# Patient Record
Sex: FEMALE | Race: WHITE | NOT HISPANIC OR LATINO | Employment: OTHER | URBAN - METROPOLITAN AREA
[De-identification: names, ages, dates, MRNs, and addresses within clinical notes are randomized per-mention and may not be internally consistent; named-entity substitution may affect disease eponyms.]

---

## 2023-06-02 ENCOUNTER — TELEPHONE (OUTPATIENT)
Dept: FAMILY MEDICINE CLINIC | Facility: CLINIC | Age: 88
End: 2023-06-02

## 2023-06-02 NOTE — TELEPHONE ENCOUNTER
Caller: IMELDA TSANG    Relationship to patient: Emergency Contact    Best call back number: [phone]    Type of visit: NEW PATIENT    Requested date: AS SOON AS POSSIBLE    If rescheduling, when is the original appointment: 6.23.23/7.21.23    Additional notes: PLEASE CALL TO RESCHEDULE THE PATIENT SOONER OR CALL TO ADVISE.     THANK YOU.

## 2023-06-29 PROBLEM — F03.B3 MODERATE DEMENTIA WITH MOOD DISTURBANCE: Status: ACTIVE | Noted: 2023-06-29

## 2023-06-29 PROBLEM — K21.9 GASTROESOPHAGEAL REFLUX DISEASE WITHOUT ESOPHAGITIS: Status: ACTIVE | Noted: 2023-06-29

## 2023-06-29 PROBLEM — Z13.220 ENCOUNTER FOR LIPID SCREENING FOR CARDIOVASCULAR DISEASE: Status: ACTIVE | Noted: 2023-06-29

## 2023-06-29 PROBLEM — E03.9 HYPOTHYROIDISM: Status: ACTIVE | Noted: 2023-06-29

## 2023-06-29 PROBLEM — Z79.899 HIGH RISK MEDICATION USE: Status: ACTIVE | Noted: 2023-06-29

## 2023-06-29 PROBLEM — Z13.6 ENCOUNTER FOR LIPID SCREENING FOR CARDIOVASCULAR DISEASE: Status: ACTIVE | Noted: 2023-06-29

## 2023-06-29 PROBLEM — F33.1 MODERATE EPISODE OF RECURRENT MAJOR DEPRESSIVE DISORDER: Status: ACTIVE | Noted: 2023-06-29

## 2023-06-29 PROBLEM — R51.9 PERSISTENT HEADACHES: Status: ACTIVE | Noted: 2023-06-29

## 2023-08-03 ENCOUNTER — OFFICE VISIT (OUTPATIENT)
Dept: FAMILY MEDICINE CLINIC | Facility: CLINIC | Age: 88
End: 2023-08-03
Payer: MEDICARE

## 2023-08-03 VITALS
SYSTOLIC BLOOD PRESSURE: 118 MMHG | HEIGHT: 62 IN | TEMPERATURE: 98 F | OXYGEN SATURATION: 97 % | RESPIRATION RATE: 15 BRPM | BODY MASS INDEX: 24.73 KG/M2 | HEART RATE: 78 BPM | WEIGHT: 134.4 LBS | DIASTOLIC BLOOD PRESSURE: 74 MMHG

## 2023-08-03 DIAGNOSIS — N18.31 STAGE 3A CHRONIC KIDNEY DISEASE (CKD): ICD-10-CM

## 2023-08-03 DIAGNOSIS — R51.9 PERSISTENT HEADACHES: ICD-10-CM

## 2023-08-03 DIAGNOSIS — K59.1 FUNCTIONAL DIARRHEA: ICD-10-CM

## 2023-08-03 DIAGNOSIS — R73.03 PREDIABETES: ICD-10-CM

## 2023-08-03 DIAGNOSIS — F33.1 MODERATE EPISODE OF RECURRENT MAJOR DEPRESSIVE DISORDER: ICD-10-CM

## 2023-08-03 DIAGNOSIS — F03.B3 MODERATE DEMENTIA WITH MOOD DISTURBANCE, UNSPECIFIED DEMENTIA TYPE: ICD-10-CM

## 2023-08-03 DIAGNOSIS — R42 LIGHTHEADEDNESS: Primary | ICD-10-CM

## 2023-08-03 RX ORDER — SERTRALINE HYDROCHLORIDE 100 MG/1
100 TABLET, FILM COATED ORAL DAILY
Qty: 90 TABLET | Refills: 1 | Status: SHIPPED | OUTPATIENT
Start: 2023-08-03

## 2023-08-03 RX ORDER — LOPERAMIDE HYDROCHLORIDE 2 MG/1
2 TABLET ORAL 2 TIMES DAILY PRN
Qty: 180 TABLET | Refills: 1 | Status: SHIPPED | OUTPATIENT
Start: 2023-08-03

## 2023-08-03 RX ORDER — DONEPEZIL HYDROCHLORIDE 5 MG/1
5 TABLET, FILM COATED ORAL NIGHTLY
Qty: 90 TABLET | Refills: 1 | Status: SHIPPED | OUTPATIENT
Start: 2023-08-03

## 2023-08-04 PROBLEM — N18.31 STAGE 3A CHRONIC KIDNEY DISEASE (CKD): Status: ACTIVE | Noted: 2023-08-04

## 2023-08-04 PROBLEM — R42 LIGHTHEADEDNESS: Status: ACTIVE | Noted: 2023-08-04

## 2023-08-04 PROBLEM — R73.03 PREDIABETES: Status: ACTIVE | Noted: 2023-08-04

## 2023-08-05 ENCOUNTER — APPOINTMENT (OUTPATIENT)
Dept: CT IMAGING | Facility: HOSPITAL | Age: 88
End: 2023-08-05
Payer: MEDICARE

## 2023-08-05 ENCOUNTER — HOSPITAL ENCOUNTER (EMERGENCY)
Facility: HOSPITAL | Age: 88
Discharge: HOME | End: 2023-08-05
Attending: EMERGENCY MEDICINE
Payer: MEDICARE

## 2023-08-05 VITALS
HEART RATE: 64 BPM | BODY MASS INDEX: 24.66 KG/M2 | SYSTOLIC BLOOD PRESSURE: 143 MMHG | RESPIRATION RATE: 14 BRPM | TEMPERATURE: 97.8 F | DIASTOLIC BLOOD PRESSURE: 62 MMHG | HEIGHT: 62 IN | WEIGHT: 134 LBS | OXYGEN SATURATION: 95 %

## 2023-08-05 DIAGNOSIS — R10.9 ABDOMINAL CRAMPING: Primary | ICD-10-CM

## 2023-08-05 LAB
ALBUMIN SERPL-MCNC: 4.3 G/DL (ref 3.5–5.2)
ALBUMIN/GLOB SERPL: 1.5 G/DL
ALP SERPL-CCNC: 88 U/L (ref 39–117)
ALT SERPL W P-5'-P-CCNC: 15 U/L (ref 1–33)
ANION GAP SERPL CALCULATED.3IONS-SCNC: 11 MMOL/L (ref 5–15)
AST SERPL-CCNC: 24 U/L (ref 1–32)
BASOPHILS # BLD AUTO: 0.07 10*3/MM3 (ref 0–0.2)
BASOPHILS NFR BLD AUTO: 0.7 % (ref 0–1.5)
BILIRUB SERPL-MCNC: 0.4 MG/DL (ref 0–1.2)
BILIRUB UR QL STRIP: NEGATIVE
BUN SERPL-MCNC: 11 MG/DL (ref 8–23)
BUN/CREAT SERPL: 10.5 (ref 7–25)
CALCIUM SPEC-SCNC: 9.3 MG/DL (ref 8.6–10.5)
CHLORIDE SERPL-SCNC: 100 MMOL/L (ref 98–107)
CLARITY UR: CLEAR
CO2 SERPL-SCNC: 29 MMOL/L (ref 22–29)
COLOR UR: YELLOW
CREAT SERPL-MCNC: 1.05 MG/DL (ref 0.57–1)
D-LACTATE SERPL-SCNC: 1.1 MMOL/L (ref 0.5–2)
DEPRECATED RDW RBC AUTO: 49.7 FL (ref 37–54)
EGFRCR SERPLBLD CKD-EPI 2021: 51.5 ML/MIN/1.73
EOSINOPHIL # BLD AUTO: 0.12 10*3/MM3 (ref 0–0.4)
EOSINOPHIL NFR BLD AUTO: 1.3 % (ref 0.3–6.2)
ERYTHROCYTE [DISTWIDTH] IN BLOOD BY AUTOMATED COUNT: 15.4 % (ref 12.3–15.4)
GLOBULIN UR ELPH-MCNC: 2.8 GM/DL
GLUCOSE SERPL-MCNC: 88 MG/DL (ref 65–99)
GLUCOSE UR STRIP-MCNC: NEGATIVE MG/DL
HCT VFR BLD AUTO: 42.4 % (ref 34–46.6)
HGB BLD-MCNC: 12.8 G/DL (ref 12–15.9)
HGB UR QL STRIP.AUTO: NEGATIVE
HOLD SPECIMEN: NORMAL
IMM GRANULOCYTES # BLD AUTO: 0.03 10*3/MM3 (ref 0–0.05)
IMM GRANULOCYTES NFR BLD AUTO: 0.3 % (ref 0–0.5)
KETONES UR QL STRIP: NEGATIVE
LEUKOCYTE ESTERASE UR QL STRIP.AUTO: NEGATIVE
LIPASE SERPL-CCNC: 89 U/L (ref 13–60)
LYMPHOCYTES # BLD AUTO: 2.17 10*3/MM3 (ref 0.7–3.1)
LYMPHOCYTES NFR BLD AUTO: 23.1 % (ref 19.6–45.3)
MCH RBC QN AUTO: 26.6 PG (ref 26.6–33)
MCHC RBC AUTO-ENTMCNC: 30.2 G/DL (ref 31.5–35.7)
MCV RBC AUTO: 88.1 FL (ref 79–97)
MONOCYTES # BLD AUTO: 0.57 10*3/MM3 (ref 0.1–0.9)
MONOCYTES NFR BLD AUTO: 6.1 % (ref 5–12)
NEUTROPHILS NFR BLD AUTO: 6.44 10*3/MM3 (ref 1.7–7)
NEUTROPHILS NFR BLD AUTO: 68.5 % (ref 42.7–76)
NITRITE UR QL STRIP: NEGATIVE
NRBC BLD AUTO-RTO: 0 /100 WBC (ref 0–0.2)
PH UR STRIP.AUTO: 5.5 [PH] (ref 5–8)
PLATELET # BLD AUTO: 248 10*3/MM3 (ref 140–450)
PMV BLD AUTO: 9.6 FL (ref 6–12)
POTASSIUM SERPL-SCNC: 4.3 MMOL/L (ref 3.5–5.2)
PROT SERPL-MCNC: 7.1 G/DL (ref 6–8.5)
PROT UR QL STRIP: NEGATIVE
RBC # BLD AUTO: 4.81 10*6/MM3 (ref 3.77–5.28)
SODIUM SERPL-SCNC: 140 MMOL/L (ref 136–145)
SP GR UR STRIP: 1.01 (ref 1–1.03)
UROBILINOGEN UR QL STRIP: NORMAL
WBC NRBC COR # BLD: 9.4 10*3/MM3 (ref 3.4–10.8)
WHOLE BLOOD HOLD COAG: NORMAL
WHOLE BLOOD HOLD SPECIMEN: NORMAL

## 2023-08-05 PROCEDURE — 74177 CT ABD & PELVIS W/CONTRAST: CPT

## 2023-08-05 PROCEDURE — 85025 COMPLETE CBC W/AUTO DIFF WBC: CPT | Performed by: EMERGENCY MEDICINE

## 2023-08-05 PROCEDURE — 80053 COMPREHEN METABOLIC PANEL: CPT | Performed by: EMERGENCY MEDICINE

## 2023-08-05 PROCEDURE — 81003 URINALYSIS AUTO W/O SCOPE: CPT | Performed by: NURSE PRACTITIONER

## 2023-08-05 PROCEDURE — 83690 ASSAY OF LIPASE: CPT | Performed by: EMERGENCY MEDICINE

## 2023-08-05 PROCEDURE — 25510000001 IOPAMIDOL 61 % SOLUTION: Performed by: EMERGENCY MEDICINE

## 2023-08-05 PROCEDURE — 99285 EMERGENCY DEPT VISIT HI MDM: CPT

## 2023-08-05 PROCEDURE — 83605 ASSAY OF LACTIC ACID: CPT | Performed by: EMERGENCY MEDICINE

## 2023-08-05 PROCEDURE — 82565 ASSAY OF CREATININE: CPT

## 2023-08-05 RX ORDER — SODIUM CHLORIDE 0.9 % (FLUSH) 0.9 %
10 SYRINGE (ML) INJECTION AS NEEDED
Status: DISCONTINUED | OUTPATIENT
Start: 2023-08-05 | End: 2023-08-05 | Stop reason: HOSPADM

## 2023-08-05 RX ORDER — SODIUM CHLORIDE 9 MG/ML
10 INJECTION INTRAVENOUS AS NEEDED
Status: DISCONTINUED | OUTPATIENT
Start: 2023-08-05 | End: 2023-08-05 | Stop reason: HOSPADM

## 2023-08-05 RX ADMIN — SODIUM CHLORIDE 500 ML: 9 INJECTION, SOLUTION INTRAVENOUS at 17:29

## 2023-08-05 RX ADMIN — IOPAMIDOL 90 ML: 612 INJECTION, SOLUTION INTRAVENOUS at 18:10

## 2023-08-05 NOTE — ED PROVIDER NOTES
EMERGENCY DEPARTMENT ENCOUNTER    Pt Name: Joanna Crockett  MRN: [id number]  Pt :   1935  Room Number:    Date of encounter:  2023  PCP: Lianne Castellanos PA  ED Provider: ERNESTO Maria    Historian: patient      HPI:  Chief Complaint: Abdominal pain        Context: Joanna Crockett is a 88 y.o. female who presents to the ED c/o intermittent abdominal pain for the last several days.  Her son and caregiver volunteers that she completed a course of antibiotics 1 week ago for urinary tract infection.  Because she had some diarrhea, she took an Imodium yesterday and since then, she has not been able to have a bowel movement.  Her last normal bowel movement was day before yesterday.  She has had no rectal bleeding.  She denies any rectal pain or fullness.  She denies any dysuria, frequency or urgency.  She has been eating, drinking well.    Review of systems is negative for fever or chills.  Negative for chest pain or cough or shortness of breath.  GI: Positive for constipation with bowel movement last effective on 2 days ago.  No rectal bleeding.  No nausea or vomiting.  : History of UTI without history of current dysuria, frequency or urgency.  No profound weakness, dizziness or syncope.  No neurosensory complaint or focal weakness he has a history of dementia and has poor recollection of her recent symptoms.      PAST MEDICAL HISTORY  Past Medical History:   Diagnosis Date    Dementia     Depression     Diarrhea     GERD (gastroesophageal reflux disease)     Hypothyroidism          PAST SURGICAL HISTORY  No past surgical history on file.      FAMILY HISTORY  No family history on file.      SOCIAL HISTORY  Social History     Socioeconomic History    Marital status:    Tobacco Use    Smoking status: Never    Smokeless tobacco: Never   Substance and Sexual Activity    Alcohol use: Never    Drug use: Never    Sexual activity: Defer         ALLERGIES  Patient has no known  allergies.        REVIEW OF SYSTEMS  Review of Systems     All systems reviewed and negative except for those discussed in HPI.       PHYSICAL EXAM    I have reviewed the triage vital signs and nursing notes.    ED Triage Vitals [08/05/23 1411]   Temp Heart Rate Resp BP SpO2   97.8 øF (36.6 øC) 62 14 136/67 92 %      Temp src Heart Rate Source Patient Position BP Location FiO2 (%)   Oral Monitor Sitting Right arm --       Physical Exam  GENERAL:   Appears in no acute distress.  Patient is alert and oriented.  She is a very good historian with exception of some memory problems for which she looks to her son for accurate answers.  He is a good historian at her bedside  HENT: Nares patent.  EYES: No scleral icterus.  CV: Regular rhythm, regular rate.  No tachycardia.  No peripheral edema  RESPIRATORY: Normal effort.  No audible wheezes, rales or rhonchi.  ABDOMEN: Soft, nontender.  No flank pain.  MUSCULOSKELETAL: No deformities.   NEURO: Alert, moves all extremities, follows commands.  SKIN: Warm, dry, no rash visualized.      LAB RESULTS  Recent Results (from the past 24 hour(s))   Comprehensive Metabolic Panel    Collection Time: 08/05/23  3:01 PM    Specimen: Blood   Result Value Ref Range    Glucose 88 65 - 99 mg/dL    BUN 11 8 - 23 mg/dL    Creatinine 1.05 (H) 0.57 - 1.00 mg/dL    Sodium 140 136 - 145 mmol/L    Potassium 4.3 3.5 - 5.2 mmol/L    Chloride 100 98 - 107 mmol/L    CO2 29.0 22.0 - 29.0 mmol/L    Calcium 9.3 8.6 - 10.5 mg/dL    Total Protein 7.1 6.0 - 8.5 g/dL    Albumin 4.3 3.5 - 5.2 g/dL    ALT (SGPT) 15 1 - 33 U/L    AST (SGOT) 24 1 - 32 U/L    Alkaline Phosphatase 88 39 - 117 U/L    Total Bilirubin 0.4 0.0 - 1.2 mg/dL    Globulin 2.8 gm/dL    A/G Ratio 1.5 g/dL    BUN/Creatinine Ratio 10.5 7.0 - 25.0    Anion Gap 11.0 5.0 - 15.0 mmol/L    eGFR 51.5 (L) >60.0 mL/min/1.73   Lipase    Collection Time: 08/05/23  3:01 PM    Specimen: Blood   Result Value Ref Range    Lipase 89 (H) 13 - 60 U/L   Lactic  Acid, Plasma    Collection Time: 08/05/23  3:01 PM    Specimen: Blood   Result Value Ref Range    Lactate 1.1 0.5 - 2.0 mmol/L   Green Top (Gel)    Collection Time: 08/05/23  3:01 PM   Result Value Ref Range    Extra Tube Hold for add-ons.    Lavender Top    Collection Time: 08/05/23  3:01 PM   Result Value Ref Range    Extra Tube hold for add-on    Gold Top - SST    Collection Time: 08/05/23  3:01 PM   Result Value Ref Range    Extra Tube Hold for add-ons.    Light Blue Top    Collection Time: 08/05/23  3:01 PM   Result Value Ref Range    Extra Tube Hold for add-ons.    CBC Auto Differential    Collection Time: 08/05/23  3:01 PM    Specimen: Blood   Result Value Ref Range    WBC 9.40 3.40 - 10.80 10*3/mm3    RBC 4.81 3.77 - 5.28 10*6/mm3    Hemoglobin 12.8 12.0 - 15.9 g/dL    Hematocrit 42.4 34.0 - 46.6 %    MCV 88.1 79.0 - 97.0 fL    MCH 26.6 26.6 - 33.0 pg    MCHC 30.2 (L) 31.5 - 35.7 g/dL    RDW 15.4 12.3 - 15.4 %    RDW-SD 49.7 37.0 - 54.0 fl    MPV 9.6 6.0 - 12.0 fL    Platelets 248 140 - 450 10*3/mm3    Neutrophil % 68.5 42.7 - 76.0 %    Lymphocyte % 23.1 19.6 - 45.3 %    Monocyte % 6.1 5.0 - 12.0 %    Eosinophil % 1.3 0.3 - 6.2 %    Basophil % 0.7 0.0 - 1.5 %    Immature Grans % 0.3 0.0 - 0.5 %    Neutrophils, Absolute 6.44 1.70 - 7.00 10*3/mm3    Lymphocytes, Absolute 2.17 0.70 - 3.10 10*3/mm3    Monocytes, Absolute 0.57 0.10 - 0.90 10*3/mm3    Eosinophils, Absolute 0.12 0.00 - 0.40 10*3/mm3    Basophils, Absolute 0.07 0.00 - 0.20 10*3/mm3    Immature Grans, Absolute 0.03 0.00 - 0.05 10*3/mm3    nRBC 0.0 0.0 - 0.2 /100 WBC   Urinalysis With Microscopic If Indicated (No Culture) - Urine, Clean Catch    Collection Time: 08/05/23  5:02 PM    Specimen: Urine, Clean Catch   Result Value Ref Range    Color, UA Yellow Yellow, Straw    Appearance, UA Clear Clear    pH, UA 5.5 5.0 - 8.0    Specific Gravity, UA 1.012 1.001 - 1.030    Glucose, UA Negative Negative    Ketones, UA Negative Negative    Bilirubin, UA  Negative Negative    Blood, UA Negative Negative    Protein, UA Negative Negative    Leuk Esterase, UA Negative Negative    Nitrite, UA Negative Negative    Urobilinogen, UA 0.2 E.U./dL 0.2 - 1.0 E.U./dL       If labs were ordered, I independently reviewed the results and considered them in treating the patient.        RADIOLOGY  CT Abdomen Pelvis With Contrast    Result Date: 8/5/2023  CT ABDOMEN PELVIS W CONTRAST Date of Exam: 8/5/2023 5:56 PM EDT Indication: Lower abdominal pain. Comparison: Right upper quadrant ultrasound dated 12/7/2004. Technique: Axial CT images were obtained of the abdomen and pelvis following the uneventful intravenous administration of 90 mL Isovue 300. Reconstructed coronal and sagittal images were also obtained. Automated exposure control and iterative construction methods were used. Findings: The heart size is normal. There is no pericardial effusion. The lung bases are clear. The liver is normal in size and contour. There are multiple small low-density lesions throughout the liver likely representing small cysts. The gallbladder is surgically absent. There is dilation of the extrahepatic bile duct measuring up to 9 mm. This can be seen with physiologic changes after cholecystectomy in the absence of abnormal liver function tests or elevated bilirubin. The spleen, adrenal glands, and pancreas appear within normal limits. The kidneys are symmetric in size and enhancement. There is no hydronephrosis or hydroureter. The urinary bladder is partially fluid-filled without wall thickening. Evaluation of the pelvis is limited due to streak artifact from patient's left total hip prosthesis. There is a small hiatal hernia. The small bowel is normal in caliber without evidence of small bowel obstruction or small bowel inflammation. The appendix is not visualized and may be surgically absent. There is no evidence of acute colitis. There is pancolonic diverticulosis without evidence of acute  diverticulitis. There is no free fluid or free air. The aorta is normal in caliber without aneurysm formation. There is mild aortoiliac atherosclerotic disease. There is no mesenteric, retroperitoneal, or pelvic lymphadenopathy. There are degenerative changes at L4-L5 with endplate osteophyte formation. There is a left total hip prosthesis.     Impression: 1. No acute intra-abdominal or pelvic abnormality. 2. Pancolonic diverticulosis without evidence of acute diverticulitis. 3. Multiple small simple hepatic cysts. 4. Dilation of the common bile duct measuring up to 9 mm likely representing physiologic changes after cholecystectomy in the absence of abnormal liver function tests or elevated bilirubin. 5. Small sliding-type hiatal hernia. Electronically Signed: Javier Nairelor  8/5/2023 6:21 PM EDT  Workstation ID: [id number]   PROCEDURES    Procedures    No orders to display       MEDICATIONS GIVEN IN ER    Medications   Sodium Chloride (PF) 0.9 % 10 mL (has no administration in time range)   sodium chloride 0.9 % flush 10 mL (has no administration in time range)   sodium chloride 0.9 % bolus 500 mL (500 mL Intravenous New Bag 8/5/23 1729)   iopamidol (ISOVUE-300) 61 % injection 100 mL (90 mL Intravenous Given 8/5/23 1810)         MEDICAL DECISION MAKING, PROGRESS, and CONSULTS    All labs have been independently reviewed by me.  All radiology studies have been reviewed by me and the radiologist dictating the report.  All EKG's have been independently viewed and interpreted by me/my attending physician.          Orders placed during this visit:  Orders Placed This Encounter   Procedures    CT Abdomen Pelvis With Contrast    [place] Draw    Comprehensive Metabolic Panel    Lipase    Lactic Acid, Plasma    CBC Auto Differential    Urinalysis With Microscopic If Indicated (No Culture) - Urine, Catheter    NPO Diet NPO Type: Strict NPO    Undress & Gown    Insert Peripheral IV    Insert Peripheral IV    CBC & Differential     Green Top (Gel)    Lavender Top    Gold Top - SST    Gray Top    Light Blue Top         Additional orders considered but not ordered:      ED Course:    Consultants:      ED Course as of 08/05/23 1858   Sat Aug 05, 2023   1827 CT Abdomen Pelvis With Contrast [MS]   1856 Patient's work-up has been negative for acute findings.  In the meantime, Ms. Crockett has been asymptomatic throughout her ED course.  Her vital signs of been stable throughout her ED course.  We discussed with her and her son parameters for concern that would warrant return to the emergency department.  Ms. Crockett and her son understand and concur with this outpatient plan and close follow-up [MS]   1857 They are relieved to find that she has no urinary tract infection. [MS]      ED Course User Index  [MS] TaniaNorma meeks APRN              Shared Decision Making:  After my consideration of clinical presentation and any laboratory/radiology studies obtained, I discussed the findings with the patient/patient representative who is in agreement with the treatment plan and the final disposition.   Risks and benefits of discharge and/or observation/admission were discussed.       AS OF 18:58 EDT VITALS:    BP - 143/62  HR - 64  TEMP - 97.8 øF (36.6 øC) (Oral)  O2 SATS - 95%                  DIAGNOSIS  Final diagnoses:   Abdominal cramping         DISPOSITION    DISCHARGE    Patient discharged in stable condition.    Reviewed implications of results, diagnosis, meds, responsibility to follow up, warning signs and symptoms of possible worsening, potential complications and reasons to return to ER.    Patient/Family voiced understanding of above instructions.    Discussed plan for discharge, as there is no emergent indication for admission.  Pt/family is agreeable and understands need for follow up and possible repeat testing.  Pt/family is aware that discharge does not mean that nothing is wrong but that it indicates no emergency is currently present  that requires admission and they must continue care with follow-up as given below or with a physician of their choice.     FOLLOW-UP  Lianne Castellanos PA  [address] [place] [id number]  [phone]    Schedule an appointment as soon as possible for a visit in 2 days  If symptoms worsen         Medication List      No changes were made to your prescriptions during this visit.           Please note that portions of this document were completed with voice recognition software.        Norma Marin, ERNESTO  08/05/23 [id number]

## 2023-08-05 NOTE — DISCHARGE INSTRUCTIONS
Home to rest.  Maintain your very best hydration and nutrition.  Return to the emergency department as needed for worsening symptoms or concerns which includes, but is not limited to: Fever, intractable pain, intractable vomiting.  Thank you

## 2023-08-07 LAB — CREAT BLDA-MCNC: 1.2 MG/DL (ref 0.6–1.3)

## 2023-08-08 ENCOUNTER — OFFICE VISIT (OUTPATIENT)
Dept: CARDIOLOGY | Facility: CLINIC | Age: 88
End: 2023-08-08
Payer: MEDICARE

## 2023-08-08 VITALS
BODY MASS INDEX: 24.48 KG/M2 | WEIGHT: 133 LBS | RESPIRATION RATE: 18 BRPM | HEIGHT: 62 IN | HEART RATE: 77 BPM | DIASTOLIC BLOOD PRESSURE: 64 MMHG | SYSTOLIC BLOOD PRESSURE: 112 MMHG | OXYGEN SATURATION: 95 %

## 2023-08-08 DIAGNOSIS — R06.02 SHORTNESS OF BREATH: ICD-10-CM

## 2023-08-08 DIAGNOSIS — R01.1 HEART MURMUR: Primary | ICD-10-CM

## 2023-08-08 PROCEDURE — 1159F MED LIST DOCD IN RCRD: CPT | Performed by: INTERNAL MEDICINE

## 2023-08-08 PROCEDURE — 93000 ELECTROCARDIOGRAM COMPLETE: CPT | Performed by: INTERNAL MEDICINE

## 2023-08-08 PROCEDURE — 99203 OFFICE O/P NEW LOW 30 MIN: CPT | Performed by: INTERNAL MEDICINE

## 2023-08-08 PROCEDURE — 1160F RVW MEDS BY RX/DR IN RCRD: CPT | Performed by: INTERNAL MEDICINE

## 2023-08-09 LAB — NT-PROBNP SERPL-MCNC: 365 PG/ML (ref 0–738)

## 2023-08-09 NOTE — PROGRESS NOTES
[facility]  [facility] CARDIOLOGY  [address] [place] [address]  Dept: [phone]  Dept Fax: [phone]    Joanna Crockett  1935    New Patient Office Note    History of Present Illness:  Joanna Crockett is a 88 y.o. female who presents to the clinic for Establish Care.She is 88 years old . She is not sure why she is here, she is at the office with her nephew, denies any chest pain, edema, palpitations, she states I have some dizziness but not now, the nephew added that she is somewhat SOB on walking, her BP is 110.70, denies any diabetes, hypertension,  no smoker, no ETOH, her cardiac exam is consistent with mild MAMIE likely from aortic sclerosis.,her EKG sinus with non specific st t abnormalities diffuse, ,will get an echo and will get some lab Pro BNP and also troponin,     The following portions of the patient's history were reviewed and updated as appropriate: allergies, current medications, past family history, past medical history, past social history, past surgical history, and problem list.    Medications:  loperamide  sertraline    Subjective  No Known Allergies     Past Medical History:   Diagnosis Date    Dementia     Depression     Diarrhea     GERD (gastroesophageal reflux disease)     Hypothyroidism        History reviewed. No pertinent surgical history.    History reviewed. No pertinent family history.     Social History     Socioeconomic History    Marital status:    Tobacco Use    Smoking status: Never    Smokeless tobacco: Never   Vaping Use    Vaping Use: Never used   Substance and Sexual Activity    Alcohol use: Never    Drug use: Never    Sexual activity: Defer       Review of Systems   Constitutional: Negative.    HENT: Negative.     Respiratory:  Positive for shortness of breath.    Cardiovascular: Negative.    Endocrine: Negative.    Genitourinary: Negative.    Musculoskeletal: Negative.    Skin: Negative.   "  Allergic/Immunologic: Negative.    Neurological: Negative.    Hematological: Negative.    Psychiatric/Behavioral: Negative.       Cardiovascular Procedures    ECHO/MUGA:  STRESS TESTS:   CARDIAC CATH:   DEVICES:   HOLTER:   CT/MRI:   VASCULAR:   CARDIOTHORACIC:     Objective  Vitals:    08/08/23 1454   BP: 112/64   BP Location: Right arm   Patient Position: Sitting   Cuff Size: Adult   Pulse: 77   Resp: 18   SpO2: 95%   Weight: 60.3 kg (133 lb)   Height: 157.5 cm (62\")   PainSc: 0-No pain       Physical Exam  Vitals reviewed.   Constitutional:       Appearance: Healthy appearance. Not in distress.   Neck:      Vascular: No JVR. JVD normal.   Pulmonary:      Effort: Pulmonary effort is normal.      Breath sounds: Normal breath sounds. No wheezing. No rhonchi. No rales.   Chest:      Chest wall: Not tender to palpatation.   Cardiovascular:      PMI at left midclavicular line. Normal rate. Regular rhythm. Normal S1. Normal S2.       Murmurs: There is a grade 2/6 harsh midsystolic murmur at the URSB, radiating to the neck.      No gallop.  No click. No rub.   Pulses:     Intact distal pulses.   Edema:     Peripheral edema absent.   Abdominal:      General: Bowel sounds are normal.      Palpations: Abdomen is soft.      Tenderness: There is no abdominal tenderness.   Musculoskeletal: Normal range of motion.         General: No tenderness. Skin:     General: Skin is warm and dry.   Neurological:      General: No focal deficit present.      Mental Status: Alert and oriented to person, place and time.        Diagnostic Data    ECG 12 Lead    Date/Time: 8/8/2023 9:17 PM  Performed by: Reji Allison MD  Authorized by: Reji Allison MD   Comparison: not compared with previous ECG   Previous ECG: no previous ECG available  Rhythm: sinus rhythm  BPM: 62  QRS axis: normal  Other findings: non-specific ST-T wave changes    Clinical impression: abnormal EKG      Assessment and Plan  Diagnoses and all orders for " this visit:    Heart murmur-Likely from Aortic sclerosis, will get an echo  -     Adult Transthoracic Echo Complete W/ Cont if Necessary Per Protocol; Future  -     proBNP  -     Cancel: Troponin I; Future  -     Troponin I    Shortness of breath_- likely from diastolic heart failure, will see the echo, no edema, will check BNP  -     Adult Transthoracic Echo Complete W/ Cont if Necessary Per Protocol; Future  -     proBNP  -     Cancel: Troponin I; Future  -     Troponin I        Return in about 2 weeks (around 8/22/2023) for Recheck with Dr. Allison.    Reji Allison MD  08/08/2023

## 2023-08-10 LAB — TROPONIN T SERPL HS-MCNC: 18 NG/L (ref 0–14)

## 2023-08-22 ENCOUNTER — OFFICE VISIT (OUTPATIENT)
Dept: CARDIOLOGY | Facility: CLINIC | Age: 88
End: 2023-08-22
Payer: MEDICARE

## 2023-08-22 VITALS
OXYGEN SATURATION: 99 % | HEART RATE: 79 BPM | SYSTOLIC BLOOD PRESSURE: 114 MMHG | HEIGHT: 62 IN | DIASTOLIC BLOOD PRESSURE: 66 MMHG | RESPIRATION RATE: 18 BRPM | BODY MASS INDEX: 24.84 KG/M2 | WEIGHT: 135 LBS

## 2023-08-22 DIAGNOSIS — I35.8 AORTIC VALVE SCLEROSIS: ICD-10-CM

## 2023-08-22 DIAGNOSIS — R06.02 SHORTNESS OF BREATH: Primary | ICD-10-CM

## 2023-08-22 PROBLEM — R01.1 HEART MURMUR: Status: RESOLVED | Noted: 2023-08-08 | Resolved: 2023-08-22

## 2023-08-22 PROCEDURE — 1159F MED LIST DOCD IN RCRD: CPT | Performed by: INTERNAL MEDICINE

## 2023-08-22 PROCEDURE — 99213 OFFICE O/P EST LOW 20 MIN: CPT | Performed by: INTERNAL MEDICINE

## 2023-08-22 PROCEDURE — 1160F RVW MEDS BY RX/DR IN RCRD: CPT | Performed by: INTERNAL MEDICINE

## 2023-08-22 NOTE — PROGRESS NOTES
[facility]  [facility] CARDIOLOGY  [address] [place] [address]  Dept: [phone]  Dept Fax: [phone]    Joanna Crockett  1935    Follow Up Office Visit Note    History of Present Illness:  Joanna Crockett is a 88 y.o. female who presents to the clinic for Follow-up.Dizziness and heart murmur- She did have the echo normal Ef with aortic sclerosis, she is not longer having any dizziness or SOB, advised to call us if she has any further problems    The following portions of the patient's history were reviewed and updated as appropriate: allergies, current medications, past family history, past medical history, past social history, past surgical history, and problem list.    Medications:  sertraline    Subjective  No Known Allergies     Past Medical History:   Diagnosis Date    Dementia     Depression     Diarrhea     GERD (gastroesophageal reflux disease)     Hypothyroidism        History reviewed. No pertinent surgical history.    History reviewed. No pertinent family history.     Social History     Socioeconomic History    Marital status:    Tobacco Use    Smoking status: Never    Smokeless tobacco: Never   Vaping Use    Vaping Use: Never used   Substance and Sexual Activity    Alcohol use: Never    Drug use: Never    Sexual activity: Defer       Review of Systems   Constitutional: Negative.    HENT: Negative.     Respiratory: Negative.     Cardiovascular: Negative.    Endocrine: Negative.    Genitourinary: Negative.    Musculoskeletal: Negative.    Skin: Negative.    Allergic/Immunologic: Negative.    Neurological: Negative.    Hematological: Negative.    Psychiatric/Behavioral: Negative.       Cardiovascular Procedures    ECHO/MUGA:  STRESS TESTS:   CARDIAC CATH:   DEVICES:   HOLTER:   CT/MRI:   VASCULAR:   CARDIOTHORACIC:     Objective  Vitals:    08/22/23 1435   BP: 114/66   BP Location: Right arm   Patient Position: Lying   Cuff Size: Adult   Pulse: 79  "  Resp: 18   SpO2: 99%   Weight: 61.2 kg (135 lb)   Height: 157.5 cm (62\")   PainSc: 0-No pain     Body mass index is 24.69 kg/mý.     Physical Exam  Vitals reviewed.   Constitutional:       Appearance: Healthy appearance. Not in distress.   Neck:      Vascular: No JVR. JVD normal.   Pulmonary:      Effort: Pulmonary effort is normal.      Breath sounds: Normal breath sounds. No wheezing. No rhonchi. No rales.   Chest:      Chest wall: Not tender to palpatation.   Cardiovascular:      PMI at left midclavicular line. Normal rate. Regular rhythm. Normal S1. Normal S2.       Murmurs: There is a grade 2/6 harsh midsystolic murmur at the URSB, radiating to the neck.      No gallop.  No click. No rub.   Pulses:     Intact distal pulses.   Edema:     Peripheral edema absent.   Abdominal:      General: Bowel sounds are normal.      Palpations: Abdomen is soft.      Tenderness: There is no abdominal tenderness.   Musculoskeletal: Normal range of motion.         General: No tenderness. Skin:     General: Skin is warm and dry.   Neurological:      General: No focal deficit present.      Mental Status: Alert and oriented to person, place and time.        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    Reji Allison MD  08/22/2023  "

## 2023-09-19 ENCOUNTER — OFFICE VISIT (OUTPATIENT)
Dept: FAMILY MEDICINE CLINIC | Facility: CLINIC | Age: 88
End: 2023-09-19
Payer: MEDICARE

## 2023-09-19 VITALS
BODY MASS INDEX: 24.66 KG/M2 | WEIGHT: 134 LBS | DIASTOLIC BLOOD PRESSURE: 58 MMHG | HEART RATE: 72 BPM | SYSTOLIC BLOOD PRESSURE: 100 MMHG | TEMPERATURE: 97.3 F | HEIGHT: 62 IN | RESPIRATION RATE: 18 BRPM

## 2023-09-19 DIAGNOSIS — G89.29 CHRONIC NONINTRACTABLE HEADACHE, UNSPECIFIED HEADACHE TYPE: ICD-10-CM

## 2023-09-19 DIAGNOSIS — F33.1 MODERATE EPISODE OF RECURRENT MAJOR DEPRESSIVE DISORDER: ICD-10-CM

## 2023-09-19 DIAGNOSIS — Z00.00 ENCOUNTER FOR MEDICAL EXAMINATION TO ESTABLISH CARE: ICD-10-CM

## 2023-09-19 DIAGNOSIS — F03.B3 MODERATE DEMENTIA WITH MOOD DISTURBANCE, UNSPECIFIED DEMENTIA TYPE: Primary | ICD-10-CM

## 2023-09-19 DIAGNOSIS — R51.9 CHRONIC NONINTRACTABLE HEADACHE, UNSPECIFIED HEADACHE TYPE: ICD-10-CM

## 2023-09-19 RX ORDER — SERTRALINE HYDROCHLORIDE 100 MG/1
TABLET, FILM COATED ORAL
Qty: 90 TABLET | Refills: 1 | Status: SHIPPED | OUTPATIENT
Start: 2023-09-19

## 2023-09-19 NOTE — PROGRESS NOTES
"Chief Complaint   Patient presents with    Establish Care     Memory loss/ possible dementia,  pt states she has headaches all the time. Pt also gets bouts of diarrhea with pain.       Subjective      Joanna Crockett is a 88 y.o. who presents to [facility] and discuss memory loss.  Has been having trouble with memory for awhile now and she \"can't remember anything.\"  States she is getting worse.    Lives with . He is wheelchair bound.  She takes care of her .  Has a  (neighbor) that brings her to appointment.  Has three adult children   Starts everyday with making her bed, get's dressed, goes downstairs and fixes breakfast. Cleans up after breakfast. Does puzzles on iPad. States no energy.  Takes her own shower.    No falls recently.  Has had falls in the past but cannot recall when the last was.    Headaches frequently, but not every day. States she did not have headaches when she was young.   [facility] with her  that helps with groceries.    Patient helps  with baths as needed.  Does not have to lift on her , he can transfer from chair to bed.    He has written a letter to provider about medical history.    Has an appointment with neuro in Oct.  Needs refill of Zoloft.    The following portions of the patient's history were reviewed and updated as appropriate: allergies, current medications, past family history, past medical history, past social history, past surgical history, and problem list.    Review of Systems   Neurological:  Positive for headache and memory problem. Negative for numbness.     Objective   Vital Signs:  /58   Pulse 72   Temp 97.3 °F (36.3 °C)   Resp 18   Ht 157.5 cm (62\")   Wt 60.8 kg (134 lb)   BMI 24.51 kg/m²     BMI is within normal parameters. No other follow-up for BMI required.        Physical Exam  Vitals and nursing note reviewed.   Constitutional:       Appearance: Normal appearance.   Cardiovascular:      Rate and " Rhythm: Normal rate and regular rhythm.      Heart sounds: Normal heart sounds.   Pulmonary:      Breath sounds: Normal breath sounds.   Neurological:      Mental Status: She is alert and oriented to person, place, and time. Mental status is at baseline.      Cranial Nerves: No cranial nerve deficit.      Motor: No weakness.      Gait: Gait normal.   Psychiatric:         Mood and Affect: Mood normal.         Behavior: Behavior normal.        Result Review                     Assessment and Plan  Diagnoses and all orders for this visit:    1. Moderate dementia with mood disturbance, unspecified dementia type (Primary)    2. Moderate episode of recurrent major depressive disorder  -     sertraline (ZOLOFT) 100 MG tablet; Take 1.5 tablets by mouth every AM  Dispense: 90 tablet; Refill: 1    3. Chronic nonintractable headache, unspecified headache type    4. Encounter for medical examination to establish care      Continue plan per neurology for memory loss / headaches.    Plan for annual wellness visit at follow up.  Recheck in 3 months, sooner if needed.   We discussed fall risk and prevention today.         Discussed medications prescribed and OTC medications recommended.  Discussed medication safety, possible side effects and how to take or administer medications. Instructed patient to report any adverse reactions, side effects or concerns.     Reviewed physical exam findings and plan with patient who verbalized understanding and agrees with plan of care. Patient was given opportunity to ask questions and all concerns were addressed prior to the conclusion of today's visit.     Follow Up  Return in about 3 months (around 12/19/2023) for Recheck, Medicare Wellness.  Patient was given instructions and counseling regarding her condition or for health maintenance advice. Please see specific information pulled into the AVS if appropriate.     Note to patient: The 21st Century Cures Act makes medical notes like these  available to patients in the interest of transparency. However, be advised this is a medical document. It is intended as peer to peer communication. It is written in medical language and may contain abbreviations or verbiage that are unfamiliar. It may appear blunt or direct. Medical documents are intended to carry relevant information, facts as evident, and the clinical opinion of the provider.

## 2023-09-27 ENCOUNTER — PATIENT ROUNDING (BHMG ONLY) (OUTPATIENT)
Dept: FAMILY MEDICINE CLINIC | Facility: CLINIC | Age: 88
End: 2023-09-27
Payer: MEDICARE

## 2023-09-27 NOTE — PROGRESS NOTES
A My-Chart message has been sent to the patient for PATIENT ROUNDING with [facility].

## 2023-10-27 ENCOUNTER — OFFICE VISIT (OUTPATIENT)
Dept: NEUROLOGY | Facility: CLINIC | Age: 88
End: 2023-10-27
Payer: MEDICARE

## 2023-10-27 ENCOUNTER — LAB (OUTPATIENT)
Dept: LAB | Facility: HOSPITAL | Age: 88
End: 2023-10-27
Payer: MEDICARE

## 2023-10-27 VITALS
SYSTOLIC BLOOD PRESSURE: 112 MMHG | HEART RATE: 60 BPM | DIASTOLIC BLOOD PRESSURE: 60 MMHG | HEIGHT: 62 IN | WEIGHT: 134 LBS | OXYGEN SATURATION: 95 % | BODY MASS INDEX: 24.66 KG/M2

## 2023-10-27 DIAGNOSIS — M54.2 CHRONIC NECK PAIN: ICD-10-CM

## 2023-10-27 DIAGNOSIS — G31.84 MILD COGNITIVE IMPAIRMENT WITH MEMORY LOSS: Primary | ICD-10-CM

## 2023-10-27 DIAGNOSIS — G31.84 MILD COGNITIVE IMPAIRMENT WITH MEMORY LOSS: ICD-10-CM

## 2023-10-27 DIAGNOSIS — G89.29 CHRONIC NECK PAIN: ICD-10-CM

## 2023-10-27 DIAGNOSIS — G44.86 CERVICOGENIC HEADACHE: ICD-10-CM

## 2023-10-27 LAB
FOLATE SERPL-MCNC: >20 NG/ML (ref 4.78–24.2)
T4 SERPL-MCNC: 4.76 MCG/DL (ref 4.5–11.7)
TSH SERPL DL<=0.05 MIU/L-ACNC: 1.74 UIU/ML (ref 0.27–4.2)
VIT B12 BLD-MCNC: 355 PG/ML (ref 211–946)

## 2023-10-27 PROCEDURE — 82607 VITAMIN B-12: CPT

## 2023-10-27 PROCEDURE — 36415 COLL VENOUS BLD VENIPUNCTURE: CPT

## 2023-10-27 PROCEDURE — 84436 ASSAY OF TOTAL THYROXINE: CPT

## 2023-10-27 PROCEDURE — 82746 ASSAY OF FOLIC ACID SERUM: CPT

## 2023-10-27 PROCEDURE — 84443 ASSAY THYROID STIM HORMONE: CPT

## 2023-10-27 PROCEDURE — 83921 ORGANIC ACID SINGLE QUANT: CPT

## 2023-10-27 PROCEDURE — 86592 SYPHILIS TEST NON-TREP QUAL: CPT

## 2023-10-27 RX ORDER — MEMANTINE HYDROCHLORIDE 5 MG/1
TABLET ORAL
Qty: 127 TABLET | Refills: 0 | Status: SHIPPED | OUTPATIENT
Start: 2023-10-27 | End: 2024-01-02

## 2023-10-27 NOTE — PROGRESS NOTES
Neuro Office Visit      Encounter Date: 10/27/2023   Patient Name: Joanna Crockett  : 1935   MRN: [id number]   PCP:  Callie Phipps APRN     Chief Complaint:    Chief Complaint   Patient presents with    Dementia     With mood disturbance    Headache       History of Present Illness: Joanna Crockett is a 88 y.o. female who is here today in Neurology for  dementia and headaches    PMH of dementia, depression, diarrhea, GERD, hypothyroidism, hypothyroidism, persistent headaches, Stage 3 CKD, lightheadedness, aortic valve sclerosis    PCP started Aricept 5 mg - medication was stopped d/t severe headaches   She is in clinic today with her nephew     She has noted symptoms of memory loss since at least 3 years marked initially by forgetfulness and repetitiveness, she will go into a room and will not know why she went there. This has gradually worsened  over time. Additional symptoms have included impairments in executive function. There have been associated  symptoms of depression. Some difficulty sleeping. 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 She notes  impairments in ADL's. Her family  manages her medications  and finances. She is no longer driving . She is currently residing at home with . She provides care for her , Steve who was not able to come to clinic today. Her nephew is in clinic today and feels that her 's health needs have limited her social activities.     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 She sustained a fall about 1 year ago in which she hit her right shoulder and has noted that since that time her headaches have been worse, she will sometimes wear a padded neck brace to aid with the headaches. No associated nausea. No numbness or tingling or arm weakness.       Subjective      Review of Systems   Constitutional:  Positive for activity change, appetite change and fatigue.   HENT:  Positive for dental problem.    Eyes: Negative.    Respiratory: Negative.     Cardiovascular: Negative.    Gastrointestinal:  Positive for abdominal pain.   Endocrine: Negative.    Genitourinary:  Positive for urgency.   Musculoskeletal:  Positive for gait problem, neck pain and neck stiffness.   Skin: Negative.    Allergic/Immunologic: Negative.    Neurological:  Positive for weakness and headaches.        Memory loss   Hematological: Negative.    Psychiatric/Behavioral:  Positive for confusion and decreased concentration.           Past Medical History:   Past Medical History:   Diagnosis Date    Dementia     Depression     Diarrhea     GERD (gastroesophageal reflux disease)     Hypothyroidism        Past Surgical History: No past surgical history on file.    Family History: No family history on file.    Social History:   Social History     Socioeconomic History    Marital status:    Tobacco Use    Smoking status: Never     Passive exposure: Never    Smokeless tobacco: Never   Vaping Use    Vaping Use: Never used   Substance and Sexual Activity    Alcohol use: Never    Drug use: Never    Sexual activity: Defer       Medications:     Current Outpatient Medications:     sertraline (ZOLOFT) 100 MG tablet, Take 1.5 tablets by mouth every AM, Disp: 90 tablet, Rfl: 1    memantine (NAMENDA) 5 MG tablet, Take 1 tablet by mouth Daily for 7  "days, THEN 1 tablet 2 (Two) Times a Day for 60 days., Disp: 127 tablet, Rfl: 0    Allergies:   No Known Allergies      STEADI Fall Risk Assessment was completed, and patient is at LOW risk for falls.Assessment completed on:6/29/2023    Objective     Objective:    /60   Pulse 60   Ht 157.5 cm (62\")   Wt 60.8 kg (134 lb)   SpO2 95%   BMI 24.51 kg/m²   Body mass index is 24.51 kg/m².    Physical Exam  Vitals reviewed.   Constitutional:       Appearance: Normal appearance.   HENT:      Head: Normocephalic and atraumatic.      Mouth/Throat:      Mouth: Mucous membranes are moist.      Pharynx: Oropharynx is clear.   Pulmonary:      Effort: Pulmonary effort is normal. No respiratory distress.   Skin:     General: Skin is warm and dry.   Neurological:      Mental Status: She is alert.          Neurology Exam:    General apperance: NAD.  Intermittently tearful during exam as she is worried about dementia.    Mental status: Alert, awake and oriented to person, season, state, county, city, hospital, floor.  Disoriented to year, date, day, month.    Fund of knowledge: Limited.     Language and Speech: No aphasia or dysarthria.    Naming , Repitition and Comprehension:  Can name objects, repeat a sentence and follow commands. Speech is clear and fluent with good repetition, comprehension, and naming.    Cranial Nerves:   CN II: Visual fields are full. Intact.  Pupils - PERRLA  CN III, IV and VI: Extraocular movements are intact. Normal saccades.   CN V: Facial sensation is intact.   CN VII: Muscles of facial expression reveal no asymmetry. Intact.   CN VIII: Hearing is intact. Whispered voice intact.   CN IX and X: Palate elevates symmetrically. Intact  CN XI: Shoulder shrug is intact.   CN XII: Tongue is midline without evidence of atrophy or fasciculation.     Motor:  Right UE muscle strength 5/5. Normal tone.     Left UE muscle strength 5/5. Normal tone.      Right LE muscle strength 5/5. Normal tone.     Left LE " muscle strength 5/5. Normal tone.      Sensory: Normal light touch and vibration sensation bilaterally.    DTRs: 2+ bilaterally in upper and lower extremities.    Milvia negative in bilateral upper extremities    Coordination: Normal finger-to-nose, heel to shin B/L.    Rhomberg: Slight swaying noted.    Gait: Slow cautious gait, no tremor noted while walking, no assistive device utilized.    MMSE 22/30 with 1/3 word recall       Results:   Imaging:   CT Abdomen Pelvis With Contrast    Result Date: 8/5/2023  Impression: 1. No acute intra-abdominal or pelvic abnormality. 2. Pancolonic diverticulosis without evidence of acute diverticulitis. 3. Multiple small simple hepatic cysts. 4. Dilation of the common bile duct measuring up to 9 mm likely representing physiologic changes after cholecystectomy in the absence of abnormal liver function tests or elevated bilirubin. 5. Small sliding-type hiatal hernia. Electronically Signed: Javier Valle  8/5/2023 6:21 PM EDT  Workstation ID: [id number]       Labs:   WBC   Date Value Ref Range Status   08/05/2023 9.40 3.40 - 10.80 10*3/mm3 Final   06/29/2023 6.9 3.4 - 10.8 x10E3/uL Final     RBC   Date Value Ref Range Status   08/05/2023 4.81 3.77 - 5.28 10*6/mm3 Final   06/29/2023 4.27 3.77 - 5.28 x10E6/uL Final     Hemoglobin   Date Value Ref Range Status   08/05/2023 12.8 12.0 - 15.9 g/dL Final     Hematocrit   Date Value Ref Range Status   08/05/2023 42.4 34.0 - 46.6 % Final     MCV   Date Value Ref Range Status   08/05/2023 88.1 79.0 - 97.0 fL Final     MCH   Date Value Ref Range Status   08/05/2023 26.6 26.6 - 33.0 pg Final     MCHC   Date Value Ref Range Status   08/05/2023 30.2 (L) 31.5 - 35.7 g/dL Final     RDW   Date Value Ref Range Status   08/05/2023 15.4 12.3 - 15.4 % Final     RDW-SD   Date Value Ref Range Status   08/05/2023 49.7 37.0 - 54.0 fl Final     MPV   Date Value Ref Range Status   08/05/2023 9.6 6.0 - 12.0 fL Final     Platelets   Date Value Ref Range  Status   08/05/2023 248 140 - 450 10*3/mm3 Final     Neutrophil %   Date Value Ref Range Status   08/05/2023 68.5 42.7 - 76.0 % Final     Lymphocyte %   Date Value Ref Range Status   08/05/2023 23.1 19.6 - 45.3 % Final     Monocyte %   Date Value Ref Range Status   08/05/2023 6.1 5.0 - 12.0 % Final     Eosinophil %   Date Value Ref Range Status   08/05/2023 1.3 0.3 - 6.2 % Final     Basophil %   Date Value Ref Range Status   08/05/2023 0.7 0.0 - 1.5 % Final     Immature Grans %   Date Value Ref Range Status   08/05/2023 0.3 0.0 - 0.5 % Final     Neutrophils, Absolute   Date Value Ref Range Status   08/05/2023 6.44 1.70 - 7.00 10*3/mm3 Final     Lymphocytes, Absolute   Date Value Ref Range Status   08/05/2023 2.17 0.70 - 3.10 10*3/mm3 Final     Monocytes, Absolute   Date Value Ref Range Status   08/05/2023 0.57 0.10 - 0.90 10*3/mm3 Final     Eosinophils, Absolute   Date Value Ref Range Status   08/05/2023 0.12 0.00 - 0.40 10*3/mm3 Final     Basophils, Absolute   Date Value Ref Range Status   08/05/2023 0.07 0.00 - 0.20 10*3/mm3 Final     Immature Grans, Absolute   Date Value Ref Range Status   08/05/2023 0.03 0.00 - 0.05 10*3/mm3 Final     nRBC   Date Value Ref Range Status   08/05/2023 0.0 0.0 - 0.2 /100 WBC Final     Lab Results   Component Value Date    GLUCOSE 88 08/05/2023    BUN 11 08/05/2023    CREATININE 1.20 08/05/2023    EGFRRESULT 55 (L) 06/29/2023    EGFR 51.5 (L) 08/05/2023    BCR 10.5 08/05/2023    K 4.3 08/05/2023    CO2 29.0 08/05/2023    CALCIUM 9.3 08/05/2023    PROTENTOTREF 6.5 06/29/2023    ALBUMIN 4.3 08/05/2023    BILITOT 0.4 08/05/2023    AST 24 08/05/2023    ALT 15 08/05/2023         Assessment / Plan      Assessment/Plan:   Diagnoses and all orders for this visit:    1. Mild cognitive impairment with memory loss (Primary)  -     Vitamin B12 & Folate; Future  -     Methylmalonic Acid, Serum; Future  -     TSH; Future  -     T4; Future  -     RPR; Future  -     MRI Brain Without Contrast;  Future  -     memantine (NAMENDA) 5 MG tablet; Take 1 tablet by mouth Daily for 7 days, THEN 1 tablet 2 (Two) Times a Day for 60 days.  Dispense: 127 tablet; Refill: 0    2. Chronic neck pain  -     MRI Cervical Spine Without Contrast; Future    3. Cervicogenic headache       Joanna Crockett is in neurology clinic to establish care for memory loss and headaches.  MMSE today 22 out of 30 with 1 out of 3 for word recall, given the progressive nature of her memory loss I am concerned for an underlying Alzheimer's dementia.  She reports that she did previously try donepezil but this was discontinued due to side effect of headaches.  I have ordered screening blood work along with MRI brain.  We have elected today to start Namenda, it does appear that patient is also following up with Dr. Alfonso in November for memory as well.  I will plan to review his clinic notes at patient's follow-up visit. She also reports worsening headache over the last year, headache is not present all day but will occur most days, headache is relieved with Tylenol or Advil.  She does also report chronic neck pain and it sounds like the headache radiates up from her neck into her occiput.  I have ordered MRI cervical spine to further assess her chronic neck pain.  I am suspicious that headaches are caused at least in part by cervicogenic headache but pending results of MRI brain and MRI cervical spine will have more answers.  She does deny any radiating pain from her neck into her arms, denies any arm weakness, denies any arm numbness, sensation and strength appropriate on today's exam.   In an effort to watch closely for any side effects we elected to start medication first for memory today, I have asked patient to please contact me in 2 to 4 weeks regarding how she is doing and we may consider adding on medication for her headaches at that time.  We will plan to see her back in clinic in approximately 8 weeks or sooner for any questions or  concerns.    Patient Education:       Reviewed medications, potential side effects and signs and symptoms to report. Discussed risk versus benefits of treatment plan with patient and/or family-including medications, labs and radiology that may be ordered. Addressed questions and concerns during visit. Patient and/or family verbalized understanding and agree with plan. Instructed to call the office with any questions and report to ER with any life-threatening symptoms.     Follow Up:   Return in about 8 weeks (around 12/22/2023).    I spent 45 minutes face to face with the patient and family. I personally spent 50 percent of this time counseling and discussing diagnosis, diagnostic testing, evaluation, treatment options, and management .       During this visit the following were done:  Labs Reviewed [x]    Labs Ordered [x]    Radiology Reports Reviewed []    Radiology Ordered [x]    PCP Records Reviewed [x]    Referring Provider Records Reviewed []    ER Records Reviewed []    Hospital Records Reviewed []    History Obtained From Family [x]  Nephew assisted with HPI  Radiology Images Reviewed []    Other Reviewed []    Records Requested []      ERNESTO Saunders  [facility]  [address] [address] [phone]

## 2023-10-28 LAB — RPR SER QL: NORMAL

## 2023-10-30 ENCOUNTER — TELEPHONE (OUTPATIENT)
Dept: NEUROLOGY | Facility: CLINIC | Age: 88
End: 2023-10-30
Payer: MEDICARE

## 2023-10-30 NOTE — TELEPHONE ENCOUNTER
----- Message from ERNESTO Saunders sent at 10/30/2023  8:33 AM EDT -----  Please notify Irma that her RPR was nonreactive (normal), folate normal, vitamin B12 is on the low end of the normal for which she needs to start taking Vitamin B12 1000 mcg daily - she can get this over the counter. Thyroid function was normal.

## 2023-11-01 LAB — METHYLMALONATE SERPL-SCNC: 352 NMOL/L (ref 0–378)

## 2023-11-02 ENCOUNTER — TELEPHONE (OUTPATIENT)
Dept: NEUROLOGY | Facility: CLINIC | Age: 88
End: 2023-11-02
Payer: MEDICARE

## 2023-11-02 DIAGNOSIS — F33.1 MODERATE EPISODE OF RECURRENT MAJOR DEPRESSIVE DISORDER: ICD-10-CM

## 2023-11-02 RX ORDER — SERTRALINE HYDROCHLORIDE 100 MG/1
TABLET, FILM COATED ORAL
Qty: 90 TABLET | Refills: 5 | OUTPATIENT
Start: 2023-11-02

## 2023-11-02 NOTE — TELEPHONE ENCOUNTER
----- Message from ERNESTO Saunders sent at 11/1/2023  5:11 PM EDT -----  Methylmalonic acid level was normal.

## 2023-11-06 ENCOUNTER — TELEPHONE (OUTPATIENT)
Dept: NEUROLOGY | Facility: CLINIC | Age: 88
End: 2023-11-06

## 2023-11-06 DIAGNOSIS — G31.84 MILD COGNITIVE IMPAIRMENT WITH MEMORY LOSS: Primary | ICD-10-CM

## 2023-11-06 NOTE — TELEPHONE ENCOUNTER
Caller: IMELDA TSANG    Relationship: Emergency Contact    Best call back number: [phone]    Which medication are you concerned about: MEMANTINE 5 MG    Who prescribed you this medication: ERNESTO DELEON    When did you start taking this medication: 10/31 - 11/4    What are your concerns: PATIENT HAD WEAKNESS, FATIGUE, AND SEVERE HEADACHES WHEN TAKING THIS MEDICATION SO SHE HAS STOPPED IT. IS THERE SOMETHING ELSE YOU WOULD LIKE HER TO TRY?    How long have you had these concerns: A FEW DAYS

## 2023-11-07 NOTE — TELEPHONE ENCOUNTER
No answer when calling Steve.    *Steve (on release)called back and when I asked if she had still had appt with Dr. Alfonso tomorrow he stated that her appt was in December for him.  I explained that we had it in our system for 11/8 and gave him their number at his request.  He stated that she will most likely not be able to make appt tomorrow due to neither of them drive and transportation will be issue.    Please advise.

## 2023-11-09 RX ORDER — RIVASTIGMINE 4.6 MG/24H
1 PATCH, EXTENDED RELEASE TRANSDERMAL DAILY
Qty: 30 PATCH | Refills: 11 | Status: SHIPPED | OUTPATIENT
Start: 2023-11-09 | End: 2024-11-08

## 2023-11-09 NOTE — TELEPHONE ENCOUNTER
I have sent Rivastigmine (Exelon) patch to her requested pharmacy. I have sent lowest dose of 4.6 mg/24 hour patch, please ask that she calls us with report of how she is tolerating this new medication, we will not increase it any faster than every 4 weeks pending how she is doing, thank you!

## 2023-11-09 NOTE — TELEPHONE ENCOUNTER
Relayed Negrita's response. Patient will let us know how she does on the exelon patch and if tolerated will increase per provider.

## 2023-11-09 NOTE — TELEPHONE ENCOUNTER
Called and spoke with Irma (Steve was also with her during the call so informed both of them) and yes she is willing to start the exelon patch and try it out. They would like this sent to her local [facility] pharmacy in [facility].    As well,  Confirmed her next appt with us in January and she wrote all the info down. Steve stated understanding as well. Thanks!

## 2023-12-12 ENCOUNTER — HOSPITAL ENCOUNTER (OUTPATIENT)
Dept: MRI IMAGING | Facility: HOSPITAL | Age: 88
Discharge: HOME | End: 2023-12-12
Payer: MEDICARE

## 2023-12-12 DIAGNOSIS — M54.2 CHRONIC NECK PAIN: ICD-10-CM

## 2023-12-12 DIAGNOSIS — G31.84 MILD COGNITIVE IMPAIRMENT WITH MEMORY LOSS: ICD-10-CM

## 2023-12-12 DIAGNOSIS — G89.29 CHRONIC NECK PAIN: ICD-10-CM

## 2023-12-12 PROCEDURE — 70551 MRI BRAIN STEM W/O DYE: CPT

## 2023-12-12 PROCEDURE — 72141 MRI NECK SPINE W/O DYE: CPT

## 2023-12-13 ENCOUNTER — TELEPHONE (OUTPATIENT)
Dept: NEUROLOGY | Facility: CLINIC | Age: 88
End: 2023-12-13
Payer: MEDICARE

## 2023-12-13 DIAGNOSIS — G31.84 MILD COGNITIVE IMPAIRMENT WITH MEMORY LOSS: ICD-10-CM

## 2023-12-13 RX ORDER — MEMANTINE HYDROCHLORIDE 5 MG/1
TABLET ORAL
Qty: 127 TABLET | Refills: 4 | OUTPATIENT
Start: 2023-12-13

## 2023-12-13 NOTE — TELEPHONE ENCOUNTER
----- Message from ERNESTO Saunders sent at 12/12/2023  4:38 PM EST -----  Please notify Joanna that her MRI shows age related changes without any acute findings.

## 2023-12-13 NOTE — TELEPHONE ENCOUNTER
Rx Refill Note  Requested Prescriptions     Pending Prescriptions Disp Refills    memantine (NAMENDA) 5 MG tablet [Pharmacy Med Name: Memantine HCl 5 MG Oral Tablet] 127 tablet 4     Sig: TAKE 1 TABLET BY MOUTH DAILY FOR 7 DAYS, THEN 1 TABLET BY MOUTH  TWICE DAILY      Last filled:10/27/230 REFILLS.  Last office visit with prescribing clinician: 10/27/2023      Next office visit with prescribing clinician: 1/3/2024     ALEXANDRA BEE  12/13/23, 14:42 EST    Denied-refill not appropriate.

## 2023-12-15 ENCOUNTER — TELEPHONE (OUTPATIENT)
Dept: NEUROLOGY | Facility: CLINIC | Age: 88
End: 2023-12-15
Payer: MEDICARE

## 2024-01-03 ENCOUNTER — OFFICE VISIT (OUTPATIENT)
Dept: NEUROLOGY | Facility: CLINIC | Age: 89
End: 2024-01-03
Payer: MEDICARE

## 2024-01-03 VITALS
HEIGHT: 62 IN | HEART RATE: 83 BPM | OXYGEN SATURATION: 91 % | WEIGHT: 134 LBS | DIASTOLIC BLOOD PRESSURE: 62 MMHG | BODY MASS INDEX: 24.66 KG/M2 | SYSTOLIC BLOOD PRESSURE: 114 MMHG

## 2024-01-03 DIAGNOSIS — M47.812 CERVICAL SPONDYLOSIS: ICD-10-CM

## 2024-01-03 DIAGNOSIS — G89.29 CHRONIC NECK PAIN: ICD-10-CM

## 2024-01-03 DIAGNOSIS — G44.86 CERVICOGENIC HEADACHE: ICD-10-CM

## 2024-01-03 DIAGNOSIS — M54.2 CHRONIC NECK PAIN: ICD-10-CM

## 2024-01-03 DIAGNOSIS — G31.84 MILD COGNITIVE IMPAIRMENT WITH MEMORY LOSS: Primary | ICD-10-CM

## 2024-01-03 PROCEDURE — 99214 OFFICE O/P EST MOD 30 MIN: CPT

## 2024-01-03 PROCEDURE — 1160F RVW MEDS BY RX/DR IN RCRD: CPT

## 2024-01-03 PROCEDURE — 1159F MED LIST DOCD IN RCRD: CPT

## 2024-01-03 RX ORDER — DIPHENOXYLATE HYDROCHLORIDE AND ATROPINE SULFATE 2.5; .025 MG/1; MG/1
1 TABLET ORAL DAILY
COMMUNITY

## 2024-01-03 NOTE — PROGRESS NOTES
Neuro Office Visit      Encounter Date: 2024   Patient Name: Joanan Crockett  : 1935   MRN: [id number]   PCP:  Callie Phipps APRN     Chief Complaint:    Chief Complaint   Patient presents with    mild cognitive impairment     With memory loss       History of Present Illness: Joanna Crockett is a 88 y.o. female who is here today in Neurology for MCI and headaches.     Initial visit 10/27/2023:   Joanna Crockett is a 88 y.o. female who is here today in Neurology for  dementia and headaches     PMH of dementia, depression, diarrhea, GERD, hypothyroidism, hypothyroidism, persistent headaches, Stage 3 CKD, lightheadedness, aortic valve sclerosis     PCP started Aricept 5 mg - medication was stopped d/t severe headaches   She is in clinic today with her nephew      She has noted symptoms of memory loss since at least 3 years marked initially by forgetfulness and repetitiveness, she will go into a room and will not know why she went there. This has gradually worsened  over time. Additional symptoms have included impairments in executive function. There have been associated  symptoms of depression. Some difficulty sleeping. 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 She notes  impairments in ADL's. Her family  manages her medications  and finances. She is no longer driving . She is currently residing at home with . She provides care for her , Steve who was not able to come to clinic today. Her nephew is in clinic today and feels that her 's health needs have limited her social activities.      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 She sustained a fall about 1 year ago in which she hit her right shoulder and has noted that since that time her headaches have been worse, she will sometimes wear a padded neck brace to aid with the headaches. No associated nausea. No numbness or tingling or arm weakness.     Current visit 1/3/2024:  Joanna Crockett returns to neurology clinic for continued evaluation of memory loss, headaches, and chronic neck pain. At her initial visit MMSE was 22/30 for which she was started on Namenda (she was previously intolerant to Donepezil d/t headaches), she contacted clinic in the interim between clinic visits as she had headaches with Namenda as well. She was instead started on Exelon 4.6 mg patch which also caused headaches for which she stopped taking this about 2 weeks ago. She has a hard time falling asleep at night but sleeps deeply at night. Frequent bad dreams. Will sometimes hit  in her sleep. No hallucinations.  manages medications and assists with meals. No driving. Long term memory is intact.     She was also seen by Dr. Alfonso at the [facility] on 11/8/2023 - per his note prophylactic propranolol or depakote could be considered for headaches, he recommended avoiding TCA's. She has not been started on any medication for headaches and does not want to start a new medication until being seen by pain management. She reports that the headache typically starts in her neck and can radiate up from neck into her occiput. Headaches on the left side of her neck and can radiate to the left forehead, pain is worse when looking to the left side. She did have several weeks without  a headache. Headaches can last all day. No visual changes with the headaches. Will often sit with her eyes closed when she has a headache, light and sound bother her with the headaches. She takes Tylenol for headaches - she has also previously tried advil. She denies any arm weakness or numbness and would like to see pain management before considering neurosurgery referral. RPR was nonreactive. Vitamin B12 was 355 for which she was advised to start taking Vitamin B12 1000 mcg OTC daily. Thyroid function was normal.    MRI brain 12/12/2023 : Moderate to advanced typical chronic and age-related changes are noted as above, demonstrating expected progression from 2013 comparison. No acute findings are noted otherwise. MRI cervical spine 12/12/2023: Relatively advanced multilevel cervical spondylosis as above, most notable at C5-6 and C6-7. There is no associated focal cord signal abnormality. MRI cervical spine was reviewed with collaborating neurologist Dr. Griffith who advised that as long as there is no arm weakness or numbness to refer to pain management.     She reports that previously she was taking Zoloft 150 mg daily but recent dose from PCP was 100 mg daily - her daughter is going to contact PCP regarding dose adjustment as she felt that the higher dose worked better for mood stabilization.     Subjective      Review of Systems   Constitutional: Negative.    HENT: Negative.     Eyes:         Mild light sensitivity with headaches   Respiratory: Negative.     Cardiovascular: Negative.    Gastrointestinal: Negative.    Endocrine: Negative.    Genitourinary: Negative.    Musculoskeletal:  Positive for neck pain.   Skin: Negative.    Allergic/Immunologic: Negative.    Neurological:  Positive for headaches. Negative for weakness and numbness.        Memory loss   Psychiatric/Behavioral:  Positive for sleep disturbance. Negative for hallucinations.           Past Medical History:   Past Medical History:   Diagnosis Date  "   Dementia     Depression     Diarrhea     GERD (gastroesophageal reflux disease)     Hypothyroidism        Past Surgical History: No past surgical history on file.    Family History: No family history on file.    Social History:   Social History     Socioeconomic History    Marital status:    Tobacco Use    Smoking status: Never     Passive exposure: Never    Smokeless tobacco: Never   Vaping Use    Vaping Use: Never used   Substance and Sexual Activity    Alcohol use: Never    Drug use: Never    Sexual activity: Defer       Medications:     Current Outpatient Medications:     multivitamin tablet tablet, Take 1 tablet by mouth Daily., Disp: , Rfl:     sertraline (ZOLOFT) 100 MG tablet, Take 1.5 tablets by mouth every AM, Disp: 90 tablet, Rfl: 1    divalproex (Depakote) 250 MG DR tablet, Take 1 tablet by mouth 2 (Two) Times a Day for 90 days. Start by taking 1 tablet nightly for 1 week and then increase to one tablet twice per day if well tolerated., Disp: 60 tablet, Rfl: 2    rivastigmine (EXELON) 4.6 MG/24HR patch, Place 1 patch on the skin as directed by provider Daily., Disp: 30 patch, Rfl: 11    Allergies:   Allergies   Allergen Reactions    Morphine Unknown - High Severity         STEADI Fall Risk Assessment was completed, and patient is at HIGH risk for falls. Assessment completed on:1/3/2024    Objective     Objective:    /62   Pulse 83   Ht 157.5 cm (62\")   Wt 60.8 kg (134 lb)   SpO2 91%   BMI 24.51 kg/m²   Body mass index is 24.51 kg/m².    Physical Exam  Vitals reviewed.   Constitutional:       Appearance: Normal appearance.   HENT:      Head: Normocephalic and atraumatic.      Mouth/Throat:      Mouth: Mucous membranes are moist.      Pharynx: Oropharynx is clear.   Pulmonary:      Effort: Pulmonary effort is normal. No respiratory distress.   Musculoskeletal:      Right lower leg: No edema.   Skin:     General: Skin is warm and dry.   Neurological:      Mental Status: She is alert. "          Neurology Exam:    General apperance: NAD.     Mental status: Alert, awake and oriented to person, season, day, month. Disoriented to year, state, county, city, hospital, floor    Fund of knowledge:  Mildly limited.     Language and Speech: No aphasia or dysarthria.    Naming , Repitition and Comprehension:  Can name objects, repeat a sentence and follow commands. Speech is clear and fluent with good repetition, comprehension, and naming.    Cranial Nerves:   CN II: Visual fields are full. Intact.  Pupils - PERRLA  CN III, IV and VI: Extraocular movements are intact. Normal saccades.   CN V: Facial sensation is intact.   CN VII: Muscles of facial expression reveal no asymmetry. Intact.   CN VIII: Hearing is intact.   CN IX and X: Palate elevates symmetrically. Intact  CN XI: Shoulder shrug is intact.   CN XII: Tongue is midline without evidence of atrophy or fasciculation.     Motor:  Right UE muscle strength 5/5. Normal tone.     Left UE muscle strength 5/5. Normal tone.      Right LE muscle strength 5/5. Normal tone.     Left LE muscle strength 5/5. Normal tone.      Sensory: Normal light touch sensation bilaterally.    DTRs: 2+ bilaterally in upper and lower extremities.    Coordination: Normal finger-to-nose, heel to shin B/L.    Gait: Slow cautious gait, no assistive device.    MMSE 24/30 with 1/3 for word recall    Results:   Imaging:   MRI Brain Without Contrast    Result Date: 12/12/2023  Impression: Moderate to advanced typical chronic and age-related changes are noted as above, demonstrating expected progression from 2013 comparison. No acute findings are noted otherwise. Electronically Signed: Yoav Dominique MD  12/12/2023 1:00 PM EST  Workstation ID: [id number]    MRI Cervical Spine Without Contrast    Result Date: 12/12/2023  Impression: Relatively advanced multilevel cervical spondylosis as above, most notable at C5-6 and C6-7. There is no associated focal cord signal abnormality. Electronically  Signed: Yoav Dominique MD  12/12/2023 12:58 PM EST  Workstation ID: [id number]       Labs:   Lab Results   Component Value Date    GLUCOSE 88 08/05/2023    BUN 11 08/05/2023    CREATININE 1.20 08/05/2023    EGFRRESULT 55 (L) 06/29/2023    EGFR 51.5 (L) 08/05/2023    BCR 10.5 08/05/2023    K 4.3 08/05/2023    CO2 29.0 08/05/2023    CALCIUM 9.3 08/05/2023    PROTENTOTREF 6.5 06/29/2023    ALBUMIN 4.3 08/05/2023    BILITOT 0.4 08/05/2023    AST 24 08/05/2023    ALT 15 08/05/2023         Assessment / Plan      Assessment/Plan:   Diagnoses and all orders for this visit:    1. Mild cognitive impairment with memory loss (Primary)    2. Cervical spondylosis  -     Cancel: Ambulatory Referral to Pain Management  -     Ambulatory Referral to Pain Management    3. Chronic neck pain  -     Cancel: Ambulatory Referral to Pain Management  -     Ambulatory Referral to Pain Management    4. Cervicogenic headache  -     Cancel: Ambulatory Referral to Pain Management  -     Ambulatory Referral to Pain Management       Joanna Crockett returns to neurology clinic for continued evaluation of memory loss and headaches.  MMSE today 24 out of 30, she so far has been mildly intolerant of donepezil, Namenda, and rivastigmine due to associated headache with these medications.  I am suspicious that her chronic neck pain is likely instigating a lot of her headache pain - MRI brain 12/12/2023 : Moderate to advanced typical chronic and age-related changes are noted as above, demonstrating expected progression from 2013 comparison. No acute findings are noted otherwise. MRI cervical spine 12/12/2023: Relatively advanced multilevel cervical spondylosis as above, most notable at C5-6 and C6-7. There is no associated focal cord signal abnormality. MRI cervical spine was reviewed with collaborating neurologist Dr. Griffith who advised that as long as there is no arm weakness or numbness to refer to pain management.  She has been referred to pain management.   She did not want to start any new medications today during her visit but rather see pain management first.  Regarding her memory I would like to retry cognitive protecting medications in the near future once her headaches are better controlled as I am suspicious of an underlying dementia.     Patient Education:       Reviewed medications, potential side effects and signs and symptoms to report. Discussed risk versus benefits of treatment plan with patient and/or family-including medications, labs and radiology that may be ordered. Addressed questions and concerns during visit. Patient and/or family verbalized understanding and agree with plan. Instructed to call the office with any questions and report to ER with any life-threatening symptoms.     Follow Up:   Return in about 8 weeks (around 2/28/2024).    I spent  40  minutes in the care of this patient. I personally spent 50 percent of this time counseling and discussing diagnosis, diagnostic testing, evaluation, treatment options, and management .       During this visit the following were done:  Labs Reviewed []    Labs Ordered []    Radiology Reports Reviewed [x]    Radiology Ordered []    PCP Records Reviewed []    Referring Provider Records Reviewed []    ER Records Reviewed []    Hospital Records Reviewed []    History Obtained From Family [x]    Radiology Images Reviewed [x]    Other Reviewed [x]  Dr. Alfonso's neurology note  Records Requested []      ERNESTO Saunders  [facility]  [address] [address] [phone]

## 2024-01-04 DIAGNOSIS — G43.909 EPISODIC MIGRAINE: ICD-10-CM

## 2024-01-04 DIAGNOSIS — G44.86 CERVICOGENIC HEADACHE: Primary | ICD-10-CM

## 2024-01-04 RX ORDER — DIVALPROEX SODIUM 250 MG/1
250 TABLET, DELAYED RELEASE ORAL 2 TIMES DAILY
Qty: 60 TABLET | Refills: 2 | Status: SHIPPED | OUTPATIENT
Start: 2024-01-04 | End: 2024-04-03

## 2024-01-08 ENCOUNTER — TELEPHONE (OUTPATIENT)
Dept: FAMILY MEDICINE CLINIC | Facility: CLINIC | Age: 89
End: 2024-01-08
Payer: MEDICARE

## 2024-01-08 DIAGNOSIS — Z86.39 HX OF NON ANEMIC VITAMIN B12 DEFICIENCY: Primary | ICD-10-CM

## 2024-01-08 NOTE — TELEPHONE ENCOUNTER
"----- Message from Katie Clarke MA sent at 1/8/2024  2:08 PM EST -----  Regarding: FW: Vitamin B 12  Contact: [phone]    ----- Message -----  From: Joanna Crockett \"Irma\"  Sent: 1/5/2024   4:39 PM EST  To: [facility] Clinical Pool  Subject: Vitamin B 12                                     Hi Dr. Phipps,    My mother is a patient of yours. She used to get vitamin B 12 shots from her former doctor awhile back and she seemed to feel so much better. I was wondering if it's possible for her to start getting them again?     Thank you,   Suyapa    "

## 2024-01-08 NOTE — TELEPHONE ENCOUNTER
Last b12 was normal, but we can recheck.  I'll place the order for lab.  We cannot do injections unless b12 is low.

## 2024-01-09 ENCOUNTER — OFFICE VISIT (OUTPATIENT)
Dept: PAIN MEDICINE | Facility: CLINIC | Age: 89
End: 2024-01-09
Payer: MEDICARE

## 2024-01-09 ENCOUNTER — LAB (OUTPATIENT)
Dept: FAMILY MEDICINE CLINIC | Facility: CLINIC | Age: 89
End: 2024-01-09
Payer: MEDICARE

## 2024-01-09 VITALS — HEIGHT: 62 IN | WEIGHT: 138.2 LBS | BODY MASS INDEX: 25.43 KG/M2

## 2024-01-09 DIAGNOSIS — G89.4 CHRONIC PAIN SYNDROME: ICD-10-CM

## 2024-01-09 DIAGNOSIS — M48.02 DEGENERATIVE CERVICAL SPINAL STENOSIS: Primary | ICD-10-CM

## 2024-01-09 DIAGNOSIS — Z86.39 HX OF NON ANEMIC VITAMIN B12 DEFICIENCY: ICD-10-CM

## 2024-01-09 DIAGNOSIS — M47.812 CERVICAL SPONDYLOSIS WITHOUT MYELOPATHY: ICD-10-CM

## 2024-01-09 PROCEDURE — 99204 OFFICE O/P NEW MOD 45 MIN: CPT | Performed by: STUDENT IN AN ORGANIZED HEALTH CARE EDUCATION/TRAINING PROGRAM

## 2024-01-09 NOTE — PROGRESS NOTES
Referring Physician: Negrita Mueller APRN  [address] [facility]  [address]  [place] [address]    Primary Physician: Callie Phipps APRN    CHIEF COMPLAINT or REASON FOR VISIT: Neck Pain (New patient)      Initial history of present illness on 01/09/2024:  Ms. Joanna Crockett is 88 y.o. female who presents as a new patient referral for evaluation treatment of chronic left-sided axial neck pain.  Ms. Crockett describes a several year history of left-sided axial neck pain without any inciting event or trauma.  She denies any right-sided pain.  She denies any radiation to the upper extremities or hands.  She denies any clumsiness of the hands.  She does take Tylenol.  Has not tried muscle relaxer.  Never had any spine surgery or intervention.  Has been evaluated by neurology.  Has not completed physical therapy.    Interval history:    Interventions:      Objective Pain Scoring:   BRIEF PAIN INVENTORY:  Total score:   Pain Score    01/09/24 1311   PainSc:   8   PainLoc: Neck      PHQ-2: PHQ-2 Total Score: 3  PHQ-9: PHQ-9: Brief Depression Severity Measure Score: 12  Opioid Risk Tool:         Review of Systems:   ROS negative except as otherwise noted     Past Medical History:   Past Medical History:   Diagnosis Date    Dementia     Depression     Diarrhea     GERD (gastroesophageal reflux disease)     Hypothyroidism          Past Surgical History:   History reviewed. No pertinent surgical history.      Family History   No family history on file.      Social History   Social History     Socioeconomic History    Marital status:    Tobacco Use    Smoking status: Never     Passive exposure: Never    Smokeless tobacco: Never   Vaping Use    Vaping Use: Never used   Substance and Sexual Activity    Alcohol use: Never    Drug use: Never    Sexual activity: Defer        Medications:     Current Outpatient Medications:     divalproex (Depakote) 250 MG DR tablet, Take 1 tablet by mouth 2 (Two) Times a Day for 90  "days. Start by taking 1 tablet nightly for 1 week and then increase to one tablet twice per day if well tolerated., Disp: 60 tablet, Rfl: 2    sertraline (ZOLOFT) 100 MG tablet, Take 1.5 tablets by mouth every AM, Disp: 90 tablet, Rfl: 1    multivitamin tablet tablet, Take 1 tablet by mouth Daily. (Patient not taking: Reported on 1/9/2024), Disp: , Rfl:     rivastigmine (EXELON) 4.6 MG/24HR patch, Place 1 patch on the skin as directed by provider Daily. (Patient not taking: Reported on 1/9/2024), Disp: 30 patch, Rfl: 11        Physical Exam:     Vitals:    01/09/24 1311   Weight: 62.7 kg (138 lb 3.2 oz)   Height: 157.5 cm (62\")   PainSc:   8   PainLoc: Neck        General: Alert and oriented, No acute distress.   HEENT: Normocephalic, atraumatic.   Cardiovascular: No gross edema  Respiratory: Respirations are non-labored    Cervical Spine:   No masses or atrophy  Range of motion - Flexion normal. Extension normal. Lateral rotation reduced to the left.   Palpation -tender left  Spurling's - negative     Thoracic Spine:   Inspection: no gross abnormality  Paraspinal muscle palpation: nontender  Spinous process palpation: nontender         Motor Exam:    Strength: Rate on 1-5 scale Right Left    C5-Elbow flexion, Deltoid 5 5    C6-Wrist extension 5 5    C7- Elbow / finger extension 5 5    C8- Finger flexion 5 5    T1- Intrinsics hand 5 5       Sensory Exam: Full and equal sensation to light touch throughout.       Neurologic: Cranial Nerves II-XII are grossly intact.   Vick’s -trace bilaterally       Psychiatric: Cooperative.   Gait: Normal   Assistive Devices: None    Imaging Studies:   No results found for this or any previous visit.      Impression & Plan:       01/09/2024: Joanna Crockett is a 88 y.o. female with past medical history significant for moderate dementia, GERD, hypothyroidism, stage III CKD, prediabetes who presents to the pain clinic for evaluation and treatment of chronic left-sided axial neck " pain.  I personally reviewed and interpreted her cervical MRI dated December 12, 2023: DDD worst at C4-C7 with broad posterior disc bulges and cord contouring without edema.  Patent neuroforamina.  Examination consistent with cervical spinal stenosis, cervical facet spondylosis.  May have elements of chronic myelopathy given trace Milvia's.  We discussed conservative measures including Tylenol, TENS unit, heat therapy.  Additionally we will give her referral for physical therapy.  If continued pain can consider left paramedian cervical epidural steroid injection.  We additionally discussed peripheral nerve stimulation.    1. Degenerative cervical spinal stenosis    2. Chronic pain syndrome    3. Cervical spondylosis without myelopathy        PLAN:  1. Medication Recommendations: Recommend Voltaren topical, NSAIDs, Tylenol.  Can trial turmeric 500 mg twice daily if NSAID contraindicated.    2. Physical Therapy: Referral given today.  Trial TENS unit    3. Psychological: defer    4. Complementary and alternative (CAM) Therapies:     5. Labs/Diagnostic studies: None indicated     6. Imaging: MRI independently interpreted and reviewed with patient    7. Interventions: Consider left paramedian cervical interlaminar epidural steroid injection (55516).  If no benefit can consider Sprint peripheral nerve stimulation.    8. Referrals: PT    9. Records: n/a    10. Lifestyle goals:    Follow-up 3 months      [facility]  Jair Lovelace MD    Quality metrics:  Joanna ROGERS Bon reports a pain score of 8.  Given her pain assessment as noted, treatment options were discussed and the following options were decided upon as a follow-up plan to address the patient's pain: continuation of current treatment plan for pain.

## 2024-01-10 LAB — VIT B12 SERPL-MCNC: 936 PG/ML (ref 232–1245)

## 2024-01-19 ENCOUNTER — TELEPHONE (OUTPATIENT)
Dept: PAIN MEDICINE | Facility: CLINIC | Age: 89
End: 2024-01-19
Payer: MEDICARE

## 2024-01-19 NOTE — TELEPHONE ENCOUNTER
Spoke with Marizol Rinaldi at [facility] regarding the patient's PT order. Patient had requested home based PT.    She wanted to know if she could get verbal orders to see the patient x2/week for 2 weeks, then x1/week for 6 weeks.    She also wanted to know if an order could be placed for an OT evaluation, since she feels this would be beneficial to the patient.     Please advise.      # [phone].

## 2024-01-23 DIAGNOSIS — G89.4 CHRONIC PAIN SYNDROME: ICD-10-CM

## 2024-01-23 DIAGNOSIS — M47.812 CERVICAL SPONDYLOSIS WITHOUT MYELOPATHY: ICD-10-CM

## 2024-01-23 DIAGNOSIS — M48.02 DEGENERATIVE CERVICAL SPINAL STENOSIS: Primary | ICD-10-CM

## 2024-01-23 NOTE — TELEPHONE ENCOUNTER
Called Marizol Rinaldi at [facility] and passed along Dr Lovelace's message, and faxed the OT order.

## 2024-02-02 DIAGNOSIS — F33.1 MODERATE EPISODE OF RECURRENT MAJOR DEPRESSIVE DISORDER: ICD-10-CM

## 2024-02-02 RX ORDER — SERTRALINE HYDROCHLORIDE 100 MG/1
TABLET, FILM COATED ORAL
Qty: 90 TABLET | Refills: 5 | Status: SHIPPED | OUTPATIENT
Start: 2024-02-02

## 2024-02-16 DIAGNOSIS — G44.86 CERVICOGENIC HEADACHE: Primary | ICD-10-CM

## 2024-02-20 ENCOUNTER — LAB (OUTPATIENT)
Dept: LAB | Facility: HOSPITAL | Age: 89
End: 2024-02-20
Payer: MEDICARE

## 2024-02-20 ENCOUNTER — OFFICE VISIT (OUTPATIENT)
Dept: NEUROLOGY | Facility: CLINIC | Age: 89
End: 2024-02-20
Payer: MEDICARE

## 2024-02-20 VITALS
HEART RATE: 65 BPM | DIASTOLIC BLOOD PRESSURE: 58 MMHG | OXYGEN SATURATION: 95 % | BODY MASS INDEX: 25.44 KG/M2 | WEIGHT: 138.23 LBS | HEIGHT: 62 IN | SYSTOLIC BLOOD PRESSURE: 100 MMHG

## 2024-02-20 DIAGNOSIS — G44.86 CERVICOGENIC HEADACHE: ICD-10-CM

## 2024-02-20 DIAGNOSIS — G43.909 EPISODIC MIGRAINE: ICD-10-CM

## 2024-02-20 DIAGNOSIS — G31.84 MILD COGNITIVE IMPAIRMENT WITH MEMORY LOSS: ICD-10-CM

## 2024-02-20 DIAGNOSIS — G43.909 EPISODIC MIGRAINE: Primary | ICD-10-CM

## 2024-02-20 PROCEDURE — 80053 COMPREHEN METABOLIC PANEL: CPT

## 2024-02-20 PROCEDURE — 36415 COLL VENOUS BLD VENIPUNCTURE: CPT

## 2024-02-20 PROCEDURE — 85652 RBC SED RATE AUTOMATED: CPT

## 2024-02-20 PROCEDURE — 99214 OFFICE O/P EST MOD 30 MIN: CPT

## 2024-02-20 PROCEDURE — 1160F RVW MEDS BY RX/DR IN RCRD: CPT

## 2024-02-20 PROCEDURE — 1159F MED LIST DOCD IN RCRD: CPT

## 2024-02-20 PROCEDURE — 86140 C-REACTIVE PROTEIN: CPT

## 2024-02-20 RX ORDER — RIMEGEPANT SULFATE 75 MG/75MG
75 TABLET, ORALLY DISINTEGRATING ORAL AS NEEDED
Qty: 4 TABLET | Refills: 0 | COMMUNITY
Start: 2024-02-20

## 2024-02-20 NOTE — PROGRESS NOTES
Neuro Office Visit      Encounter Date: 2024   Patient Name: Joanna Crockett  : 1935   MRN: [id number]   PCP:  Callie Phipps APRN     Chief Complaint:    Chief Complaint   Patient presents with    Mild cognitive impairment with memory loss       History of Present Illness: Joanna Crockett is a 89 y.o. female who is here today in Neurology for MCI and headaches    Initial visit 10/27/2023:   Joanna Crockett is a 88 y.o. female who is here today in Neurology for  dementia and headaches     PMH of dementia, depression, diarrhea, GERD, hypothyroidism, hypothyroidism, persistent headaches, Stage 3 CKD, lightheadedness, aortic valve sclerosis     PCP started Aricept 5 mg - medication was stopped d/t severe headaches   She is in clinic today with her nephew      She has noted symptoms of memory loss since at least 3 years marked initially by forgetfulness and repetitiveness, she will go into a room and will not know why she went there. This has gradually worsened  over time. Additional symptoms have included impairments in executive function. There have been associated  symptoms of depression. Some difficulty sleeping. 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 She notes  impairments in ADL's. Her family  manages her medications  and finances. She is no longer driving . She is currently residing at home with . She provides care for her , Steve who was not able to come to clinic today. Her nephew is in clinic today and feels that her 's health needs have limited her social activities.      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 She sustained a fall about 1 year ago in which she hit her right shoulder and has noted that since that time her headaches have been worse, she will sometimes wear a padded neck brace to aid with the headaches. No associated nausea. No numbness or tingling or arm weakness.      Follow up visit 1/3/2024:  Joanna Crockett returns to neurology clinic for continued evaluation of memory loss, headaches, and chronic neck pain. At her initial visit MMSE was 22/30 for which she was started on Namenda (she was previously intolerant to Donepezil d/t headaches), she contacted clinic in the interim between clinic visits as she had headaches with Namenda as well. She was instead started on Exelon 4.6 mg patch which also caused headaches for which she stopped taking this about 2 weeks ago. She has a hard time falling asleep at night but sleeps deeply at night. Frequent bad dreams. Will sometimes hit  in her sleep. No hallucinations.  manages medications and assists with meals. No driving. Long term memory is intact.     She was also seen by Dr. Alfonso at the [facility] on 11/8/2023 - per his note prophylactic propranolol or depakote could be considered for headaches, he recommended avoiding TCA's. She has not been started on any medication for headaches and does not want to start a new medication until being seen by pain management. She reports that the headache typically starts in her neck and can radiate up from neck into her occiput. Headaches on the left side of her neck and can radiate to the left forehead, pain is worse when looking to the left side. She did have several weeks without a  headache. Headaches can last all day. No visual changes with the headaches. Will often sit with her eyes closed when she has a headache, light and sound bother her with the headaches. She takes Tylenol for headaches - she has also previously tried advil. She denies any arm weakness or numbness and would like to see pain management before considering neurosurgery referral. RPR was nonreactive. Vitamin B12 was 355 for which she was advised to start taking Vitamin B12 1000 mcg OTC daily. Thyroid function was normal.     MRI brain 12/12/2023 : Moderate to advanced typical chronic and age-related changes are noted as above, demonstrating expected progression from 2013 comparison. No acute findings are noted otherwise. MRI cervical spine 12/12/2023: Relatively advanced multilevel cervical spondylosis as above, most notable at C5-6 and C6-7. There is no associated focal cord signal abnormality. MRI cervical spine was reviewed with collaborating neurologist Dr. Griffith who advised that as long as there is no arm weakness or numbness to refer to pain management.      She reports that previously she was taking Zoloft 150 mg daily but recent dose from PCP was 100 mg daily - her daughter is going to contact PCP regarding dose adjustment as she felt that the higher dose worked better for mood stabilization.       Current visit 2/20/2024:  Joanna Crockett returns to neurology clinic for continued evaluation of MCI and headaches. She was previously seen by Dr. Alfonso ( neurology) and Dr. Lovelace with pain management.     Per Dr. Lovelace:  01/09/2024: Joanna Crockett is a 88 y.o. female with past medical history significant for moderate dementia, GERD, hypothyroidism, stage III CKD, prediabetes who presents to the pain clinic for evaluation and treatment of chronic left-sided axial neck pain.  I personally reviewed and interpreted her cervical MRI dated December 12, 2023: DDD worst at C4-C7 with broad posterior disc bulges and cord  "contouring without edema.  Patent neuroforamina.  Examination consistent with cervical spinal stenosis, cervical facet spondylosis.  May have elements of chronic myelopathy given trace Milvia's.  We discussed conservative measures including Tylenol, TENS unit, heat therapy.  Additionally we will give her referral for physical therapy.  If continued pain can consider left paramedian cervical epidural steroid injection.  We additionally discussed peripheral nerve stimulation.     In clinic today she is accompanied by her nephew and her daughter Suyapa was present via telephone. No more nausea or vomiting. She has consistently been wearing Rivastigmine patch 4.6 mg over the last few weeks and they feel that her memory is stable, her  assists with medications and she had not always consistently been wearing the patch previously.  manages medications and finances. Headaches are still present - she has not noted any improvement in headaches so far. She has been taking Depakote 1-2 times per day. She does feel that Depakote may help some with headaches but has not consistently been taking it twice per day. Daughter reports that there was a week in which she did not have any headaches after starting the depakote. Headaches start in her neck and then radiate into her head - she helps take care of her , daughter does feel that stress makes the headaches worse. Pain starts in the neck and radiates to left side of her head, headaches can last all day. Headache is described as a \"thumping pain\", headaches have been present for over a year, she takes tylenol PRN, she previously had tried advil/ibuprofen but stopped d/t stomach issues. Tylenol helps with the headaches. Light /sound sensitivity. No visual changes. Seeing PT for her neck and has not noted much improvement yet. No numbness or weakness of her arms.  No history of liver issues per patient and daughter.      Subjective      Review of Systems "   Eyes: Negative.    Respiratory: Negative.     Cardiovascular: Negative.    Gastrointestinal:         Nausea/vomiting have resolved   Endocrine: Negative.    Genitourinary: Negative.    Musculoskeletal:  Positive for neck pain.   Skin: Negative.    Allergic/Immunologic: Negative.    Neurological:  Positive for headaches. Negative for weakness and numbness.        Memory loss   Hematological: Negative.    Psychiatric/Behavioral:  Negative for agitation.           Past Medical History:   Past Medical History:   Diagnosis Date    Dementia     Depression     Diarrhea     GERD (gastroesophageal reflux disease)     Hypothyroidism        Past Surgical History: No past surgical history on file.    Family History: No family history on file.    Social History:   Social History     Socioeconomic History    Marital status:    Tobacco Use    Smoking status: Never     Passive exposure: Never    Smokeless tobacco: Never   Vaping Use    Vaping Use: Never used   Substance and Sexual Activity    Alcohol use: Never    Drug use: Never    Sexual activity: Defer       Medications:     Current Outpatient Medications:     divalproex (Depakote) 250 MG DR tablet, Take 1 tablet by mouth 2 (Two) Times a Day for 90 days. Start by taking 1 tablet nightly for 1 week and then increase to one tablet twice per day if well tolerated., Disp: 60 tablet, Rfl: 2    multivitamin tablet tablet, Take 1 tablet by mouth Daily., Disp: , Rfl:     rivastigmine (EXELON) 4.6 MG/24HR patch, Place 1 patch on the skin as directed by provider Daily., Disp: 30 patch, Rfl: 11    sertraline (ZOLOFT) 100 MG tablet, TAKE 1 AND 1/2 TABLETS BY MOUTH  EVERY MORNING, Disp: 90 tablet, Rfl: 5    Rimegepant Sulfate (Nurtec) 75 MG tablet dispersible tablet, Take 1 tablet by mouth As Needed (Migraine) for up to 4 doses., Disp: 4 tablet, Rfl: 0    Allergies:   Allergies   Allergen Reactions    Morphine Unknown - High Severity         STEADI Fall Risk Assessment was  "completed, and patient is at LOW risk for falls.Assessment completed on:2/20/2024    Objective     Objective:    /58   Pulse 65   Ht 157.5 cm (62\")   Wt 62.7 kg (138 lb 3.7 oz)   SpO2 95%   BMI 25.28 kg/m²   Body mass index is 25.28 kg/m².    Physical Exam  Vitals reviewed.   Constitutional:       Appearance: Normal appearance.   HENT:      Head: Normocephalic and atraumatic.      Mouth/Throat:      Mouth: Mucous membranes are moist.      Pharynx: Oropharynx is clear.   Pulmonary:      Effort: Pulmonary effort is normal. No respiratory distress.   Musculoskeletal:      Right lower leg: No edema.      Left lower leg: No edema.   Skin:     General: Skin is warm and dry.   Neurological:      Mental Status: She is alert.          Neurology Exam:    General apperance: NAD.     Mental status: Alert, awake and oriented to person, season, day, state, county, city, hospital, floor. Disoriented to date and month.     Fund of knowledge:  Normal.     Language and Speech: No aphasia or dysarthria.    Naming , Repitition and Comprehension:  Can name objects, repeat a sentence and follow commands. Speech is clear and fluent with good repetition, comprehension, and naming.    Cranial Nerves:   CN II: Visual fields are full. Intact.  Pupils - PERRLA  CN III, IV and VI: Extraocular movements are intact. Normal saccades.   CN V: Facial sensation is intact.   CN VII: Muscles of facial expression reveal no asymmetry. Intact.   CN VIII: Hearing is intact.   CN IX and X: Palate elevates symmetrically. Intact  CN XI: Shoulder shrug is intact.   CN XII: Tongue is midline without evidence of atrophy or fasciculation.     Motor:  Right UE muscle strength 5/5. Normal tone.     Left UE muscle strength 5/5. Normal tone.      Right LE muscle strength 5/5. Normal tone.     Left LE muscle strength 5/5. Normal tone.      Sensory: Normal light touch sensation bilaterally.    DTRs: 2+ bilaterally in upper and lower " extremities.    Coordination: Normal finger-to-nose    Gait: Normal. No assistive device    MMSE 23/30 with 2/3 for word loss      Results:   Imaging:   MRI Brain Without Contrast    Result Date: 12/12/2023  Impression: Moderate to advanced typical chronic and age-related changes are noted as above, demonstrating expected progression from 2013 comparison. No acute findings are noted otherwise. Electronically Signed: Yoav Dominique MD  12/12/2023 1:00 PM EST  Workstation ID: [id number]    MRI Cervical Spine Without Contrast    Result Date: 12/12/2023  Impression: Relatively advanced multilevel cervical spondylosis as above, most notable at C5-6 and C6-7. There is no associated focal cord signal abnormality. Electronically Signed: Yoav Dominique MD  12/12/2023 12:58 PM EST  Workstation ID: [id number]       Labs:   Lab Results   Component Value Date    GLUCOSE 77 02/20/2024    BUN 18 02/20/2024    CREATININE 1.09 (H) 02/20/2024    EGFRRESULT 55 (L) 06/29/2023    EGFR 49.0 (L) 02/20/2024    BCR 16.5 02/20/2024    K 5.1 02/20/2024    CO2 29.3 (H) 02/20/2024    CALCIUM 9.3 02/20/2024    PROTENTOTREF 6.5 06/29/2023    ALBUMIN 4.4 02/20/2024    BILITOT 0.2 02/20/2024    AST 23 02/20/2024    ALT 13 02/20/2024         Assessment / Plan      Assessment/Plan:   Diagnoses and all orders for this visit:    1. Episodic migraine (Primary)  -     Sedimentation Rate; Future  -     C-reactive Protein; Future  -     Rimegepant Sulfate (Nurtec) 75 MG tablet dispersible tablet; Take 1 tablet by mouth As Needed (Migraine) for up to 4 doses.  Dispense: 4 tablet; Refill: 0    2. Mild cognitive impairment with memory loss    3. Cervicogenic headache       Joanna Crockett returns to neurology clinic for continued evaluation of migraines, MCI, and cervicogenic headaches. She was previously started on Depakote 250 mg and has been taking this once per day, I have instructed her to increase this to BID for increased headache prevention. She also has  mixed components of migraines and cervicogenic headaches. I have given her sample of Nurtec for migraines and asked that she contact clinic if she finds it to be beneficial so that we can start working PA. I also ordered ESR, CRP, and CMP today to r/o temporal arteritis as cause of her headaches. She is also following with Dr. Lovelace - per review of his documentation she might be candidate for steroid injections pending how she does with PT - they plan on following up with pain management. Regarding her memory, this has overall been stable, MMSE stable today, she was inconsistently utilizing Rivastigmine patch previously and recently started wearing this consistently - will plan on adjusting dose at next visit if she continues to wear consistently. She had previously noted headaches with Donepezil and Namenda.       Patient Education:       Reviewed medications, potential side effects and signs and symptoms to report. Discussed risk versus benefits of treatment plan with patient and/or family-including medications, labs and radiology that may be ordered. Addressed questions and concerns during visit. Patient and/or family verbalized understanding and agree with plan. Instructed to call the office with any questions and report to ER with any life-threatening symptoms.     Follow Up:   Return in about 3 months (around 5/20/2024).    I spent  31  minutes in the care of this patient. I personally spent 50 percent of this time counseling and discussing diagnosis, diagnostic testing, evaluation, treatment options, and management .       During this visit the following were done:  Labs Reviewed [x]    Labs Ordered [x]    Radiology Reports Reviewed [x]    Radiology Ordered []    PCP Records Reviewed []    Referring Provider Records Reviewed []    ER Records Reviewed []    Hospital Records Reviewed []    History Obtained From Family [x]    Radiology Images Reviewed []    Other Reviewed []    Records Requested []      Negrita  ERNESTO Heard  [facility]  [address]  [place] [address] [phone]

## 2024-02-21 ENCOUNTER — TELEPHONE (OUTPATIENT)
Dept: NEUROLOGY | Facility: CLINIC | Age: 89
End: 2024-02-21
Payer: MEDICARE

## 2024-02-21 LAB
ALBUMIN SERPL-MCNC: 4.4 G/DL (ref 3.5–5.2)
ALBUMIN/GLOB SERPL: 2.1 G/DL
ALP SERPL-CCNC: 51 U/L (ref 39–117)
ALT SERPL W P-5'-P-CCNC: 13 U/L (ref 1–33)
ANION GAP SERPL CALCULATED.3IONS-SCNC: 8.7 MMOL/L (ref 5–15)
AST SERPL-CCNC: 23 U/L (ref 1–32)
BILIRUB SERPL-MCNC: 0.2 MG/DL (ref 0–1.2)
BUN SERPL-MCNC: 18 MG/DL (ref 8–23)
BUN/CREAT SERPL: 16.5 (ref 7–25)
CALCIUM SPEC-SCNC: 9.3 MG/DL (ref 8.6–10.5)
CHLORIDE SERPL-SCNC: 99 MMOL/L (ref 98–107)
CO2 SERPL-SCNC: 29.3 MMOL/L (ref 22–29)
CREAT SERPL-MCNC: 1.09 MG/DL (ref 0.57–1)
CRP SERPL-MCNC: <0.3 MG/DL (ref 0–0.5)
EGFRCR SERPLBLD CKD-EPI 2021: 49 ML/MIN/1.73
ERYTHROCYTE [SEDIMENTATION RATE] IN BLOOD: 8 MM/HR (ref 0–30)
GLOBULIN UR ELPH-MCNC: 2.1 GM/DL
GLUCOSE SERPL-MCNC: 77 MG/DL (ref 65–99)
POTASSIUM SERPL-SCNC: 5.1 MMOL/L (ref 3.5–5.2)
PROT SERPL-MCNC: 6.5 G/DL (ref 6–8.5)
SODIUM SERPL-SCNC: 137 MMOL/L (ref 136–145)

## 2024-02-21 NOTE — TELEPHONE ENCOUNTER
----- Message from ERNESTO Saunders sent at 2/21/2024  8:15 AM EST -----  Labs are stable. Inflammatory markers are normal.

## 2024-02-22 ENCOUNTER — TELEPHONE (OUTPATIENT)
Dept: PAIN MEDICINE | Facility: CLINIC | Age: 89
End: 2024-02-22
Payer: MEDICARE

## 2024-02-22 NOTE — TELEPHONE ENCOUNTER
Pt daughter called in want to know if she can r/s her mom follow up appointment (04/09/2024) with CESIA Baugh. Ayaka stated that her mom is in so much pain that she can't wait until April for her f/u appt. I told her that I will have Amauri TAY to call her back.

## 2024-02-26 ENCOUNTER — TELEPHONE (OUTPATIENT)
Dept: PAIN MEDICINE | Facility: CLINIC | Age: 89
End: 2024-02-26
Payer: MEDICARE

## 2024-02-26 NOTE — TELEPHONE ENCOUNTER
"----- Message from Jenna Gamez PA-C sent at 2/23/2024  7:28 AM EST -----  Regarding: FW: Appointment Request  Contact: [phone]  Yes that is fine with me  ----- Message -----  From: Meghna Olea MA  Sent: 2/22/2024   2:34 PM EST  To: Jair Lovelace MD; Jenna Gamez PA-C  Subject: FW: Appointment Request                          Move up patient's follow up?  ----- Message -----  From: Joanna Crockett \"Irma\"  Sent: 2/22/2024  12:14 PM EST  To: [facility]  Pool  Subject: Appointment Request                              Appointment Request From: Joanna Crockett    With Provider: Jair Lovelace [[facility]]    Preferred Date Range: 2/23/2024 – 3/1/2024    Preferred Times: Monday Afternoon, Tuesday Morning, Wednesday Afternoon, Friday Afternoon    Reason for visit: Follow-up    Comments:  The injection that was discussed in first visit. The therapy is making no change.      "

## 2024-02-28 ENCOUNTER — TELEPHONE (OUTPATIENT)
Dept: PAIN MEDICINE | Facility: CLINIC | Age: 89
End: 2024-02-28
Payer: MEDICARE

## 2024-02-28 NOTE — TELEPHONE ENCOUNTER
"----- Message from Jenna Gamez PA-C sent at 2/23/2024  7:28 AM EST -----  Regarding: FW: Appointment Request  Contact: [phone]  Yes that is fine with me  ----- Message -----  From: Meghna Olea MA  Sent: 2/22/2024   2:34 PM EST  To: Jair Lovelace MD; Jenna Gamez PA-C  Subject: FW: Appointment Request                          Move up patient's follow up?  ----- Message -----  From: Joanna Crockett \"Irma\"  Sent: 2/22/2024  12:14 PM EST  To: [facility]  Pool  Subject: Appointment Request                              Appointment Request From: Joanna Crockett    With Provider: Jair Lovelace [[facility]]    Preferred Date Range: 2/23/2024 – 3/1/2024    Preferred Times: Monday Afternoon, Tuesday Morning, Wednesday Afternoon, Friday Afternoon    Reason for visit: Follow-up    Comments:  The injection that was discussed in first visit. The therapy is making no change.      "

## 2024-02-28 NOTE — TELEPHONE ENCOUNTER
Left VM for patient to call the office regarding r/s her April 2024 appointment with Pain Management.     HUB--ok to r/s to any earlier date with CESIA Holliday Monday-Thursday.

## 2024-03-10 DIAGNOSIS — G31.84 MILD COGNITIVE IMPAIRMENT WITH MEMORY LOSS: ICD-10-CM

## 2024-03-11 ENCOUNTER — OFFICE VISIT (OUTPATIENT)
Dept: PAIN MEDICINE | Facility: CLINIC | Age: 89
End: 2024-03-11
Payer: MEDICARE

## 2024-03-11 VITALS — BODY MASS INDEX: 25.65 KG/M2 | HEIGHT: 62 IN | WEIGHT: 139.4 LBS

## 2024-03-11 DIAGNOSIS — G89.4 CHRONIC PAIN SYNDROME: ICD-10-CM

## 2024-03-11 DIAGNOSIS — M48.02 DEGENERATIVE CERVICAL SPINAL STENOSIS: ICD-10-CM

## 2024-03-11 DIAGNOSIS — M47.812 CERVICAL SPONDYLOSIS WITHOUT MYELOPATHY: Primary | ICD-10-CM

## 2024-03-11 PROCEDURE — 1160F RVW MEDS BY RX/DR IN RCRD: CPT

## 2024-03-11 PROCEDURE — 1125F AMNT PAIN NOTED PAIN PRSNT: CPT

## 2024-03-11 PROCEDURE — 1159F MED LIST DOCD IN RCRD: CPT

## 2024-03-11 PROCEDURE — 99213 OFFICE O/P EST LOW 20 MIN: CPT

## 2024-03-11 RX ORDER — RIVASTIGMINE 4.6 MG/24H
1 PATCH, EXTENDED RELEASE TRANSDERMAL DAILY
Qty: 30 PATCH | Refills: 2 | Status: SHIPPED | OUTPATIENT
Start: 2024-03-11 | End: 2025-03-11

## 2024-03-11 NOTE — TELEPHONE ENCOUNTER
Rx Refill Note  Requested Prescriptions     Pending Prescriptions Disp Refills    rivastigmine (EXELON) 4.6 MG/24HR patch 30 patch 11     Sig: Place 1 patch on the skin as directed by provider Daily.      Last filled: 11/09/23 11 refill  Last office visit with prescribing clinician: 2/20/2024      Next office visit with prescribing clinician: 5/22/2024     ALEXANDRA BEE  03/11/24, 10:25 EDT    Sending to new pharmacy per patient request

## 2024-03-11 NOTE — PROGRESS NOTES
Referring Physician: No referring provider defined for this encounter.    Primary Physician: Callie Phipps APRN    CHIEF COMPLAINT or REASON FOR VISIT: Follow-up and Neck Pain      Initial history of present illness on 01/09/2024:  Ms. Joanna Crockett is 89 y.o. female who presents as a new patient referral for evaluation treatment of chronic left-sided axial neck pain.  Ms. Crockett describes a several year history of left-sided axial neck pain without any inciting event or trauma.  She denies any right-sided pain.  She denies any radiation to the upper extremities or hands.  She denies any clumsiness of the hands.  She does take Tylenol.  Has not tried muscle relaxer.  Never had any spine surgery or intervention.  Has been evaluated by neurology.  Has not completed physical therapy.    Interval history: Patient returns to clinic after completing physical therapy for chronic left-sided neck pain.  She reports she received little to no benefit from physical therapy and Occupational Therapy.  Today she continues to complain of chronic left-sided axial neck pain that will radiate up into the left side of her head.  She denies any other radicular aspect of this pain.  She is interested in interventional treatment options to help control this pain.    Interventions:      Objective Pain Scoring:   BRIEF PAIN INVENTORY:  Total score:   Pain Score    03/11/24 1319   PainSc:   5   PainLoc: Neck        PHQ-2: PHQ-2 Total Score: 4  PHQ-9: PHQ-9: Brief Depression Severity Measure Score: 5  Opioid Risk Tool:         Review of Systems:   ROS negative except as otherwise noted     Past Medical History:   Past Medical History:   Diagnosis Date    Dementia     Depression     Diarrhea     GERD (gastroesophageal reflux disease)     Hypothyroidism          Past Surgical History:   No past surgical history on file.      Family History   History reviewed. No pertinent family history.      Social History   Social History  "    Socioeconomic History    Marital status:    Tobacco Use    Smoking status: Never     Passive exposure: Never    Smokeless tobacco: Never   Vaping Use    Vaping status: Never Used   Substance and Sexual Activity    Alcohol use: Never    Drug use: Never    Sexual activity: Defer        Medications:     Current Outpatient Medications:     multivitamin tablet tablet, Take 1 tablet by mouth Daily., Disp: , Rfl:     rivastigmine (EXELON) 4.6 MG/24HR patch, Place 1 patch on the skin as directed by provider Daily., Disp: 30 patch, Rfl: 2    sertraline (ZOLOFT) 100 MG tablet, TAKE 1 AND 1/2 TABLETS BY MOUTH  EVERY MORNING, Disp: 90 tablet, Rfl: 5    divalproex (Depakote) 250 MG DR tablet, Take 1 tablet by mouth 2 (Two) Times a Day for 90 days. Start by taking 1 tablet nightly for 1 week and then increase to one tablet twice per day if well tolerated. (Patient not taking: Reported on 3/11/2024), Disp: 60 tablet, Rfl: 2        Physical Exam:     Vitals:    03/11/24 1319   Weight: 63.2 kg (139 lb 6.4 oz)   Height: 157.5 cm (62.01\")   PainSc:   5   PainLoc: Neck        General: Alert and oriented, No acute distress.   HEENT: Normocephalic, atraumatic.   Cardiovascular: No gross edema  Respiratory: Respirations are non-labored    Cervical Spine:   No masses or atrophy  Range of motion - Flexion normal. Extension normal. Lateral rotation reduced to the left.   Palpation -tender left  Spurling's - negative     Thoracic Spine:   Inspection: no gross abnormality  Paraspinal muscle palpation: nontender  Spinous process palpation: nontender         Motor Exam:    Strength: Rate on 1-5 scale Right Left    C5-Elbow flexion, Deltoid 5 5    C6-Wrist extension 5 5    C7- Elbow / finger extension 5 5    C8- Finger flexion 5 5    T1- Intrinsics hand 5 5       Sensory Exam: Full and equal sensation to light touch throughout.       Neurologic: Cranial Nerves II-XII are grossly intact.   Vick’s -trace bilaterally       Psychiatric: " Cooperative.   Gait: Normal   Assistive Devices: None    Physical exam is consistent and accurate as of 3/11/2024    Imaging Studies:   No results found for this or any previous visit.      Impression & Plan:       2024: Jaonna Crockett is a 89 y.o. female with past medical history significant for moderate dementia, GERD, hypothyroidism, stage III CKD, prediabetes who presents to the pain clinic for evaluation and treatment of chronic left-sided axial neck pain.  I personally reviewed and interpreted her cervical MRI dated 2023: DDD worst at C4-C7 with broad posterior disc bulges and cord contouring without edema.  Patent neuroforamina.  Examination consistent with cervical spinal stenosis, cervical facet spondylosis.  May have elements of chronic myelopathy given trace Milvia's.  We discussed conservative measures including Tylenol, TENS unit, heat therapy.  Additionally we will give her referral for physical therapy.  If continued pain can consider left paramedian cervical epidural steroid injection.  We additionally discussed peripheral nerve stimulation.  3/11/2024: Little to no benefit from PT/OT.  Will schedule left paramedian TY.     1. Cervical spondylosis without myelopathy    2. Degenerative cervical spinal stenosis    3. Chronic pain syndrome          PLAN:  1. Medication Recommendations: Recommend Voltaren topical, NSAIDs, Tylenol.  Can trial turmeric 500 mg twice daily if NSAID contraindicated.    2. Physical Therapy: Continue PT/HEP if desired.     3. Psychological: defer    4. Complementary and alternative (CAM) Therapies:     5. Labs/Diagnostic studies: None indicated     6. Imagin. Interventions: Schedule left paramedian cervical interlaminar epidural steroid injection (33032).  If no benefit can consider Sprint peripheral nerve stimulation.    8. Referrals: PT    9. Records: n/a    10. Lifestyle goals:    Follow-up 6 weeks after injection      [facility]  Jenna Gamez PA-C    Quality metrics:  Joanna Crockett reports a pain score of 5.  Given her pain assessment as noted, treatment options were discussed and the following options were decided upon as a follow-up plan to address the patient's pain: continuation of current treatment plan for pain.

## 2024-03-11 NOTE — TELEPHONE ENCOUNTER
Spoke w/patient and her  and he stated that they would to have it sent to [facility] due to rising copay cost.  I told him I would switch it over.  He also inquired about samples up front and I told them Ubrelvy samples have been placed at  for her.  He was in good understanding.

## 2024-03-12 ENCOUNTER — TELEPHONE (OUTPATIENT)
Dept: PAIN MEDICINE | Facility: CLINIC | Age: 89
End: 2024-03-12

## 2024-03-12 NOTE — TELEPHONE ENCOUNTER
"Hub staff attempted to follow warm transfer process and was unsuccessful     Caller: IMELDA \"PAT\" SHARAN    Relationship to patient: SPOUSE    Best call back number: [phone]    Caller is needing: PATIENT'S SPOUSE, PAT CALLED TO FIND OUT WHAT TIME HIS WIFE'S PROCEDURE IS ON 03.14.24. PAT STATED THEY WERE TOLD SOMEONE WOULD BE CALLING HER WITH THE TIME OF HER PROCEDURE BUT THEY HAVEN'T RECEIVED A CALL YET. PAT WOULD LIKE A CALL BACK ASA TO DISCUSS THE TIME OF HIS WIFE'S PROCEDURE WITH DR. ROBINS. THANK YOU!        "

## 2024-03-14 ENCOUNTER — OUTSIDE FACILITY SERVICE (OUTPATIENT)
Dept: PAIN MEDICINE | Facility: CLINIC | Age: 89
End: 2024-03-14
Payer: MEDICARE

## 2024-03-20 ENCOUNTER — TELEPHONE (OUTPATIENT)
Dept: PAIN MEDICINE | Facility: CLINIC | Age: 89
End: 2024-03-20
Payer: MEDICARE

## 2024-03-20 NOTE — TELEPHONE ENCOUNTER
----- Message from Jair Lovelace MD sent at 3/20/2024 10:17 AM EDT -----  Regarding: RE: Procedure Note  Can you please call [facility] and find out what sedation was given and the times in room for the procedure?  LKB  ----- Message -----  From: Telma Poole MA  Sent: 3/20/2024   9:37 AM EDT  To: Jair Lovelace MD  Subject: Procedure Note                                   Can you please complete the procedure note for this patient? Procedure 03/14/2024.       Thanks,   LONG Hollis

## 2024-03-20 NOTE — TELEPHONE ENCOUNTER
Called the surgery center and requested the sedation and times patient was in the room for procedures.   Facility will call back with the above information.

## 2024-04-03 ENCOUNTER — PATIENT MESSAGE (OUTPATIENT)
Dept: NEUROLOGY | Facility: CLINIC | Age: 89
End: 2024-04-03
Payer: MEDICARE

## 2024-04-03 NOTE — TELEPHONE ENCOUNTER
From: Joanna Crockett  To: Negrita Mueller  Sent: 4/3/2024 4:15 PM EDT  Subject: Appointment     Hi Dr. Mueller,   It's Suyapa, mom was scheduled for the nerve block injection with the pain clinic but somehow wires got crossed and dad got upset and doesn't want to go through the pain clinic. I was wondering if we could get her scheduled with your office for the shot and if it helps we can get her back to the clinic. I'm planning on coming back to [place] to take care of them, just not sure when. Let me know if it's possible and when, I may fly home for the appointment if I can make it work.     Thanks

## 2024-04-08 ENCOUNTER — TELEPHONE (OUTPATIENT)
Dept: NEUROLOGY | Facility: CLINIC | Age: 89
End: 2024-04-08
Payer: MEDICARE

## 2024-04-08 NOTE — TELEPHONE ENCOUNTER
Caller: IMELDA TSANG    Relationship: Emergency Contact    Best call back number: [phone]    What is the medical concern/diagnosis: SAME DX AS PREVIOUS REFERRAL     What specialty or service is being requested: PAIN MANAGEMENT    What is the provider, practice or medical service name: [facility]    What is the office location: [address] [place] [address]    What is the office phone number: [phone]    Any additional details: PATIENT HAD A REFERRAL TO THE  PAIN MANAGEMENT BUT THEY ONLY HAVE MORNING APPTS AVAILABLE AND THE PATIENT AND HER  DO NOT DRIVE AND HAVE A HARD TIME GETTING TRANSPORTATION AS EARLY AS THEY WERE HAVING HER SCHEDULE FOR.

## 2024-05-06 ENCOUNTER — PATIENT MESSAGE (OUTPATIENT)
Dept: NEUROLOGY | Facility: CLINIC | Age: 89
End: 2024-05-06
Payer: MEDICARE

## 2024-05-06 RX ORDER — METHYLPREDNISOLONE 4 MG/1
TABLET ORAL
Qty: 1 EACH | Refills: 0 | Status: SHIPPED | OUTPATIENT
Start: 2024-05-06

## 2024-05-15 ENCOUNTER — PATIENT MESSAGE (OUTPATIENT)
Dept: NEUROLOGY | Facility: CLINIC | Age: 89
End: 2024-05-15
Payer: MEDICARE

## 2024-05-17 DIAGNOSIS — G31.84 MILD COGNITIVE IMPAIRMENT WITH MEMORY LOSS: ICD-10-CM

## 2024-05-17 RX ORDER — RIVASTIGMINE 4.6 MG/24H
1 PATCH, EXTENDED RELEASE TRANSDERMAL DAILY
Qty: 90 PATCH | Refills: 3 | Status: SHIPPED | OUTPATIENT
Start: 2024-05-17 | End: 2025-05-17

## 2024-05-17 NOTE — TELEPHONE ENCOUNTER
Rx Refill Note  Requested Prescriptions     Pending Prescriptions Disp Refills    rivastigmine (EXELON) 4.6 MG/24HR patch [Pharmacy Med Name: RIVASTIGMINE PTCH 4.6MG/24] 90 patch 3     Sig: PLACE 1 PATCH ON THE SKIN AS  DIRECTED BY PROVIDER DAILY      Last filled:3/11/24 2 refills  Last office visit with prescribing clinician: 2/20/2024      Next office visit with prescribing clinician: 7/31/2024     ALEXANDRA BEE  05/17/24, 08:20 EDT    Sent to pharmacy 2 refills

## 2024-05-20 ENCOUNTER — PATIENT MESSAGE (OUTPATIENT)
Dept: NEUROLOGY | Facility: CLINIC | Age: 89
End: 2024-05-20
Payer: MEDICARE

## 2024-05-21 NOTE — TELEPHONE ENCOUNTER
Kayleigh stated that [facility] will not let her do an authorization because Dr. Boone is out of network.

## 2024-05-21 NOTE — TELEPHONE ENCOUNTER
Spoke with Kayleigh Mao is she is going to work on getting authorization for ONB and will let us know results.

## 2024-05-21 NOTE — TELEPHONE ENCOUNTER
From: Joanna Crockett  To: Negrita Mueller  Sent: 5/20/2024 11:03 AM EDT  Subject: Message sent on May 15    I sent a message on May 15 and still haven't gotten any response. I assume Dr. Mueller is on maternity leave but I thought her replacement would respond.    Thanks,   Suyapa

## 2024-07-22 DIAGNOSIS — F33.1 MODERATE EPISODE OF RECURRENT MAJOR DEPRESSIVE DISORDER: ICD-10-CM

## 2024-07-22 RX ORDER — SERTRALINE HYDROCHLORIDE 100 MG/1
TABLET, FILM COATED ORAL
Qty: 90 TABLET | Refills: 5 | OUTPATIENT
Start: 2024-07-22

## 2024-07-30 NOTE — PROGRESS NOTES
Neuro Office Visit      Encounter Date: 2024   Patient Name: Joanna Crockett  : 1935   MRN: [id number]   PCP:  Jessie Garrett APRN     Chief Complaint:    Chief Complaint   Patient presents with    Episodic migraine       History of Present Illness: Joanna Crockett is a 89 y.o. female who is here today in Neurology for headaches and memory loss  Initial visit 10/27/2023:   Joanna Crockett is a 88 y.o. female who is here today in Neurology for  dementia and headaches     PMH of dementia, depression, diarrhea, GERD, hypothyroidism, hypothyroidism, persistent headaches, Stage 3 CKD, lightheadedness, aortic valve sclerosis     PCP started Aricept 5 mg - medication was stopped d/t severe headaches   She is in clinic today with her nephew      She has noted symptoms of memory loss since at least 3 years marked initially by forgetfulness and repetitiveness, she will go into a room and will not know why she went there. This has gradually worsened  over time. Additional symptoms have included impairments in executive function. There have been associated  symptoms of depression. Some difficulty sleeping. 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 She notes  impairments in ADL's. Her family  manages her medications  and finances. She is no longer driving . She is currently residing at home with . She provides care for her , Steve who was not able to come to clinic today. Her nephew is in clinic today and feels that her 's health needs have limited her social activities.      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 She sustained a fall about 1 year ago in which she hit her right shoulder and has noted that since that time her headaches have been worse, she will sometimes wear a padded neck brace to aid with the headaches. No associated nausea. No numbness or tingling or arm weakness.      Follow up visit 1/3/2024:  Joanna Crockett returns to neurology clinic for continued evaluation of memory loss, headaches, and chronic neck pain. At her initial visit MMSE was 22/30 for which she was started on Namenda (she was previously intolerant to Donepezil d/t headaches), she contacted clinic in the interim between clinic visits as she had headaches with Namenda as well. She was instead started on Exelon 4.6 mg patch which also caused headaches for which she stopped taking this about 2 weeks ago. She has a hard time falling asleep at night but sleeps deeply at night. Frequent bad dreams. Will sometimes hit  in her sleep. No hallucinations.  manages medications and assists with meals. No driving. Long term memory is intact.     She was also seen by Dr. Alfonso at the [facility] on 11/8/2023 - per his note prophylactic propranolol or depakote could be considered for headaches, he recommended avoiding TCA's. She has not been started on any medication for headaches and does not want to start a new medication until being seen by pain management. She reports that the headache typically starts in her neck and can radiate up from neck into her occiput. Headaches on the left side of her neck and can radiate to the left forehead, pain is worse when looking to the left side. She did have several weeks without a headache. Headaches  can last all day. No visual changes with the headaches. Will often sit with her eyes closed when she has a headache, light and sound bother her with the headaches. She takes Tylenol for headaches - she has also previously tried advil. She denies any arm weakness or numbness and would like to see pain management before considering neurosurgery referral. RPR was nonreactive. Vitamin B12 was 355 for which she was advised to start taking Vitamin B12 1000 mcg OTC daily. Thyroid function was normal.     MRI brain 12/12/2023 : Moderate to advanced typical chronic and age-related changes are noted as above, demonstrating expected progression from 2013 comparison. No acute findings are noted otherwise. MRI cervical spine 12/12/2023: Relatively advanced multilevel cervical spondylosis as above, most notable at C5-6 and C6-7. There is no associated focal cord signal abnormality. MRI cervical spine was reviewed with collaborating neurologist Dr. Griffith who advised that as long as there is no arm weakness or numbness to refer to pain management.      She reports that previously she was taking Zoloft 150 mg daily but recent dose from PCP was 100 mg daily - her daughter is going to contact PCP regarding dose adjustment as she felt that the higher dose worked better for mood stabilization.         Follow up visit 2/20/2024:  Joanna Crockett returns to neurology clinic for continued evaluation of MCI and headaches. She was previously seen by Dr. Alfonso ( neurology) and Dr. Lovelace with pain management.     Per Dr. Lovelace:  01/09/2024: Joanna Crockett is a 88 y.o. female with past medical history significant for moderate dementia, GERD, hypothyroidism, stage III CKD, prediabetes who presents to the pain clinic for evaluation and treatment of chronic left-sided axial neck pain.  I personally reviewed and interpreted her cervical MRI dated December 12, 2023: DDD worst at C4-C7 with broad posterior disc bulges and cord contouring without  "edema.  Patent neuroforamina.  Examination consistent with cervical spinal stenosis, cervical facet spondylosis.  May have elements of chronic myelopathy given trace Milvia's.  We discussed conservative measures including Tylenol, TENS unit, heat therapy.  Additionally we will give her referral for physical therapy.  If continued pain can consider left paramedian cervical epidural steroid injection.  We additionally discussed peripheral nerve stimulation.      In clinic today she is accompanied by her nephew and her daughter Suyapa was present via telephone. No more nausea or vomiting. She has consistently been wearing Rivastigmine patch 4.6 mg over the last few weeks and they feel that her memory is stable, her  assists with medications and she had not always consistently been wearing the patch previously.  manages medications and finances. Headaches are still present - she has not noted any improvement in headaches so far. She has been taking Depakote 1-2 times per day. She does feel that Depakote may help some with headaches but has not consistently been taking it twice per day. Daughter reports that there was a week in which she did not have any headaches after starting the depakote. Headaches start in her neck and then radiate into her head - she helps take care of her , daughter does feel that stress makes the headaches worse. Pain starts in the neck and radiates to left side of her head, headaches can last all day. Headache is described as a \"thumping pain\", headaches have been present for over a year, she takes tylenol PRN, she previously had tried advil/ibuprofen but stopped d/t stomach issues. Tylenol helps with the headaches. Light /sound sensitivity. No visual changes. Seeing PT for her neck and has not noted much improvement yet. No numbness or weakness of her arms.  No history of liver issues per patient and daughter.       Current visit 7/31/2024:  Joanna Crockett returns to " neurology clinic for continued evaluation of migraines, MCI, and cervicogenic headaches. At her last visit she was instructed to increase Depakote to 250 mg BID, she was also provided Nurtec sample. Inflammatory markers were checked: C-reactive protein was <0.30, sedimentation rate 8 to screen for temporal arteritis. Of note she previously felt that both Donepezil and Namenda caused headaches.     She is in clinic today with family, we also discussed with her daughter Suyapa on the phone during the visit. She was taking Depakote without improvement in her symptoms so she has stopped taking this, she believes there may have been SE as well. Headaches are located at the base of her skull, left posterior neck.  Headaches present on waking up in the morning and improves some throughout the day, can last all day. She has been taking Tylenol when the pain is severe. Bright lights and loud noises worsen the headache. No nausea or vomiting with the headaches. No visual changes during headaches. She tried Nurtec without relief. She did receive 2 injections with pain management at [facility] without improvement, did not pursue ablation d/t no improvement in the head/neck pain. She did also try PT which she felt worsened the pain.     She has been wearing Rivastigmine patch 4.6mg daily. Daughter feels that memory is stable. Family assists with medications. No driving.         Subjective      Review of Systems   Constitutional: Negative.    Eyes:  Negative for visual disturbance.   Respiratory: Negative.     Cardiovascular: Negative.    Gastrointestinal:  Positive for nausea.   Endocrine: Negative.    Genitourinary: Negative.    Musculoskeletal:  Positive for neck pain.   Skin: Negative.    Allergic/Immunologic: Negative.    Neurological:  Positive for headaches.   Hematological: Negative.    Psychiatric/Behavioral:  Negative for agitation.           Past Medical History:   Past Medical History:   Diagnosis Date    CTS  "(carpal tunnel syndrome) ?    Had surgery    Dementia     Depression     Diarrhea     GERD (gastroesophageal reflux disease)     Head injury Summer of 2022    Fell and hit head    Headache, tension-type     Taking care of  in wheelchair and under a lot of stress    HL (hearing loss) About 2 years ago    Hypothyroidism     Memory loss About a year ago    Migraine Years ago    Started slowly but increasing with time       Past Surgical History:   Past Surgical History:   Procedure Laterality Date    CARPAL TUNNEL RELEASE  ?       Family History: History reviewed. No pertinent family history.    Social History:   Social History     Socioeconomic History    Marital status:    Tobacco Use    Smoking status: Never     Passive exposure: Never    Smokeless tobacco: Never   Vaping Use    Vaping status: Never Used   Substance and Sexual Activity    Alcohol use: Never    Drug use: Never    Sexual activity: Defer       Medications:     Current Outpatient Medications:     rivastigmine (EXELON) 4.6 MG/24HR patch, PLACE 1 PATCH ON THE SKIN AS  DIRECTED BY PROVIDER DAILY, Disp: 90 patch, Rfl: 3    sertraline (ZOLOFT) 100 MG tablet, TAKE 1 AND 1/2 TABLETS BY MOUTH  EVERY MORNING, Disp: 90 tablet, Rfl: 5    pregabalin (Lyrica) 25 MG capsule, Take 1 capsule by mouth Every Night for 90 days., Disp: 30 capsule, Rfl: 2    Allergies:   Allergies   Allergen Reactions    Morphine Unknown - High Severity         [facility] Fall Risk Assessment was completed, and patient is at HIGH risk for falls. Assessment completed on:7/31/2024    Objective     Objective:    /82   Pulse 72   Ht 157.5 cm (62\")   Wt 63 kg (139 lb)   SpO2 94%   BMI 25.42 kg/m²   Body mass index is 25.42 kg/m².    Physical Exam  Vitals reviewed.   Constitutional:       Appearance: Normal appearance.   HENT:      Head: Normocephalic and atraumatic.      Mouth/Throat:      Mouth: Mucous membranes are moist.      Pharynx: Oropharynx is clear.   Pulmonary:     "  Effort: Pulmonary effort is normal. No respiratory distress.   Musculoskeletal:      Right lower leg: No edema.      Left lower leg: No edema.   Skin:     General: Skin is warm and dry.   Neurological:      Mental Status: She is alert.          Neurology Exam:    General apperance: NAD.     Mental status: Alert, awake and oriented to person, day, state, county, city, hospital, floor. Disoriented to year, season, date, month.     Fund of knowledge:  Mildly limited.     Language and Speech: No aphasia or dysarthria.    Naming , Repetition and Comprehension:  Can name objects, repeat a sentence and follow commands. Speech is clear and fluent with good repetition, comprehension, and naming.    Cranial Nerves:   CN II: Visual fields are full. Intact. Pupils - PERRLA  CN III, IV and VI: Extraocular movements are intact. Normal saccades.   CN V: Facial sensation is intact.   CN VII: Muscles of facial expression reveal no asymmetry. Intact.   CN VIII: Hearing is intact.   CN IX and X: Palate elevates symmetrically. Intact  CN XI: Shoulder shrug is intact.   CN XII: Tongue is midline without evidence of atrophy or fasciculation.     Motor:  Right UE muscle strength 5/5. Normal tone.     Left UE muscle strength 5/5. Normal tone.      Right LE muscle strength 5/5. Normal tone.     Left LE muscle strength 5/5. Normal tone.      Sensory: Normal light touch sensation bilaterally.    DTRs: 2+ bilaterally in upper and lower extremities.    Coordination: Normal finger-to-nose    Gait: No assistive device, slow cautious gait.    MMSE 24/30, recall 3/3      Results:   Imaging:   No Images in the past 120 days found..     Labs:   Lab Results   Component Value Date    GLUCOSE 77 02/20/2024    BUN 18 02/20/2024    CREATININE 1.09 (H) 02/20/2024    EGFRRESULT 55 (L) 06/29/2023    EGFR 49.0 (L) 02/20/2024    BCR 16.5 02/20/2024    K 5.1 02/20/2024    CO2 29.3 (H) 02/20/2024    CALCIUM 9.3 02/20/2024    PROTENTOTREF 6.5 06/29/2023     ALBUMIN 4.4 02/20/2024    BILITOT 0.2 02/20/2024    AST 23 02/20/2024    ALT 13 02/20/2024         Assessment / Plan      Assessment/Plan:   Diagnoses and all orders for this visit:    1. Cervicogenic headache (Primary)  -     pregabalin (Lyrica) 25 MG capsule; Take 1 capsule by mouth Every Night for 90 days.  Dispense: 30 capsule; Refill: 2    2. Cervical spondylosis  -     pregabalin (Lyrica) 25 MG capsule; Take 1 capsule by mouth Every Night for 90 days.  Dispense: 30 capsule; Refill: 2    3. Chronic neck pain  -     pregabalin (Lyrica) 25 MG capsule; Take 1 capsule by mouth Every Night for 90 days.  Dispense: 30 capsule; Refill: 2    4. Mild cognitive impairment with memory loss       Irma returns to neurology clinic for continued evaluation of headache/neck pain and MCI. MRI cervical spine 12/12/2023: Relatively advanced multilevel cervical spondylosis as above, most notable at C5-6 and C6-7. There is no associated focal cord signal abnormality. She has seen pain management and does not wish to continue to receive injections or to pursue ablation. She is interested in TPI which will be scheduled with Dr. Griffith once approved. I have also started her on low dose Lyrica 25 mg nightly today to aid with cervicogenic headache and chronic neck pain, we reviewed potential side effects, she is to contact clinic if she were to feel drowsy with this medication. As part of this patient's treatment plan I am prescribing controlled substances. The patient has been made aware of appropriate use of such medications, including potential risk of somnolence, limited ability to drive and/or work safely, and potential for dependence or overdose. It has also been made clear that these medications are for use by the patient only, without concomitant use of alcohol or other substances unless prescribed. Keep out of reach of children.  [place] report has been reviewed. If this is going to be prescribed long term, [facility] Controlled  Substance Agreement Contract has also been read and signed by patient and myself.    Regarding her memory MMSE today stable at 24 out of 30, her daughter feels that her memory is stable, she has been tolerating rivastigmine 4.6 mg patch well, will continue without change today.    Will plan to see Irma back in clinic in approximately 6 weeks to assess how her headaches and neck pain are doing, she will also be scheduling a trigger point injection with Dr. Griffith once this is approved by insurance. I have reviewed her case today with Dr. Griffith and he is in agreement with proposed treatment plan.      Patient Education:       Reviewed medications, potential side effects and signs and symptoms to report. Discussed risk versus benefits of treatment plan with patient and/or family-including medications, labs and radiology that may be ordered. Addressed questions and concerns during visit. Patient and/or family verbalized understanding and agree with plan. Instructed to call the office with any questions and report to ER with any life-threatening symptoms.     Follow Up:   Return in about 6 weeks (around 9/11/2024).    I spent  53  minutes in the care of this patient. I personally spent 50 percent of this time counseling and discussing diagnosis, diagnostic testing, evaluation, treatment options, and management .       During this visit the following were done:  Labs Reviewed [x]    Labs Ordered []    Radiology Reports Reviewed [x]    Radiology Ordered []    PCP Records Reviewed []    Referring Provider Records Reviewed []    ER Records Reviewed []    Hospital Records Reviewed []    History Obtained From Family [x]    Radiology Images Reviewed [x]    Other Reviewed []    Records Requested []      ERNESTO Saunders  [facility]  [address] [address] [phone]

## 2024-07-31 ENCOUNTER — OFFICE VISIT (OUTPATIENT)
Dept: NEUROLOGY | Facility: CLINIC | Age: 89
End: 2024-07-31
Payer: MEDICARE

## 2024-07-31 VITALS
BODY MASS INDEX: 25.58 KG/M2 | HEIGHT: 62 IN | HEART RATE: 72 BPM | OXYGEN SATURATION: 94 % | SYSTOLIC BLOOD PRESSURE: 126 MMHG | DIASTOLIC BLOOD PRESSURE: 82 MMHG | WEIGHT: 139 LBS

## 2024-07-31 DIAGNOSIS — G44.86 CERVICOGENIC HEADACHE: Primary | ICD-10-CM

## 2024-07-31 DIAGNOSIS — G89.29 CHRONIC NECK PAIN: ICD-10-CM

## 2024-07-31 DIAGNOSIS — G31.84 MILD COGNITIVE IMPAIRMENT WITH MEMORY LOSS: ICD-10-CM

## 2024-07-31 DIAGNOSIS — M54.2 CHRONIC NECK PAIN: ICD-10-CM

## 2024-07-31 DIAGNOSIS — M47.812 CERVICAL SPONDYLOSIS: ICD-10-CM

## 2024-07-31 RX ORDER — PREGABALIN 25 MG/1
25 CAPSULE ORAL NIGHTLY
Qty: 30 CAPSULE | Refills: 2 | Status: SHIPPED | OUTPATIENT
Start: 2024-07-31 | End: 2024-10-29

## 2024-07-31 NOTE — Clinical Note
Dave, can you please start the PA process for TPI with Dr. Griffith? She will need to call to be scheduled once approved, thank you!

## 2024-08-12 ENCOUNTER — OFFICE VISIT (OUTPATIENT)
Dept: FAMILY MEDICINE CLINIC | Facility: CLINIC | Age: 89
End: 2024-08-12
Payer: MEDICARE

## 2024-08-12 VITALS
HEIGHT: 62 IN | SYSTOLIC BLOOD PRESSURE: 92 MMHG | WEIGHT: 136.6 LBS | RESPIRATION RATE: 16 BRPM | HEART RATE: 70 BPM | TEMPERATURE: 96.8 F | BODY MASS INDEX: 25.14 KG/M2 | DIASTOLIC BLOOD PRESSURE: 54 MMHG

## 2024-08-12 DIAGNOSIS — R07.89 ATYPICAL CHEST PAIN: ICD-10-CM

## 2024-08-12 DIAGNOSIS — R10.13 EPIGASTRIC PAIN: ICD-10-CM

## 2024-08-12 DIAGNOSIS — Z00.00 MEDICARE ANNUAL WELLNESS VISIT, SUBSEQUENT: Primary | ICD-10-CM

## 2024-08-12 DIAGNOSIS — F33.1 MODERATE EPISODE OF RECURRENT MAJOR DEPRESSIVE DISORDER: ICD-10-CM

## 2024-08-12 RX ORDER — ACETAMINOPHEN 500 MG
500 TABLET ORAL EVERY 6 HOURS PRN
COMMUNITY

## 2024-08-12 RX ORDER — SERTRALINE HYDROCHLORIDE 100 MG/1
150 TABLET, FILM COATED ORAL DAILY
Qty: 135 TABLET | Refills: 3 | Status: SHIPPED | OUTPATIENT
Start: 2024-08-12

## 2024-08-12 RX ORDER — ASPIRIN 325 MG
325 TABLET ORAL DAILY
COMMUNITY

## 2024-08-12 RX ORDER — VITAMIN B COMPLEX
CAPSULE ORAL DAILY
COMMUNITY

## 2024-08-12 RX ORDER — CALCIUM CARBONATE 500 MG/1
1 TABLET, CHEWABLE ORAL DAILY
COMMUNITY

## 2024-08-12 RX ORDER — OMEPRAZOLE 20 MG/1
20 CAPSULE, DELAYED RELEASE ORAL DAILY
Qty: 30 CAPSULE | Refills: 1 | Status: SHIPPED | OUTPATIENT
Start: 2024-08-12

## 2024-08-12 NOTE — PROGRESS NOTES
Subjective   The ABCs of the Annual Wellness Visit  Medicare Wellness Visit      Joanna Crockett is a 89 y.o. patient who presents for a Medicare Wellness Visit.    The following portions of the patient's history were reviewed and   updated as appropriate: allergies, current medications, past family history, past medical history, past social history, past surgical history, and problem list.    Compared to one year ago, the patient's physical   health is the same.  Compared to one year ago, the patient's mental   health is the same.    Recent Hospitalizations:  She was not admitted to the hospital during the last year.     Current Medical Providers:  Patient Care Team:  Jessie Garrett APRN as PCP - General (Nurse Practitioner)  Miguel Avilez MD as PCP - Family Medicine  LovelaceJair mcguire MD as Consulting Physician (Pain Medicine)    Outpatient Medications Prior to Visit   Medication Sig Dispense Refill    acetaminophen (TYLENOL) 500 MG tablet Take 1 tablet by mouth Every 6 (Six) Hours As Needed for Mild Pain.      aspirin 325 MG tablet Take 1 tablet by mouth Daily.      B Complex Vitamins (vitamin b complex) capsule capsule Take  by mouth Daily.      Calcium & Magnesium Carbonates (MYLANTA PO) Take  by mouth.      calcium carbonate (TUMS) 500 MG chewable tablet Chew 1 tablet Daily.      multivitamin with minerals (One-A-Day Womens) tablet tablet Take 1 tablet by mouth Daily.      Omega-3 Fatty Acids (OMEGA-3 EPA FISH OIL PO) Take  by mouth.      pregabalin (Lyrica) 25 MG capsule Take 1 capsule by mouth Every Night for 90 days. 30 capsule 2    rivastigmine (EXELON) 4.6 MG/24HR patch PLACE 1 PATCH ON THE SKIN AS  DIRECTED BY PROVIDER DAILY 90 patch 3    TURMERIC PO Take  by mouth.      sertraline (ZOLOFT) 100 MG tablet TAKE 1 AND 1/2 TABLETS BY MOUTH  EVERY MORNING 90 tablet 5     No facility-administered medications prior to visit.     No opioid medication identified on active medication list. I have reviewed  "chart for other potential  high risk medication/s and harmful drug interactions in the elderly.      Aspirin is on active medication list. Aspirin use is indicated based on review of current medical condition/s. Pros and cons of this therapy have been discussed today. Benefits of this medication outweigh potential harm.  Patient has been encouraged to continue taking this medication.  .      Patient Active Problem List   Diagnosis    Diarrhea    Gluteal tendinitis    Moderate dementia with mood disturbance    High risk medication use    Encounter for lipid screening for cardiovascular disease    Hypothyroidism    Gastroesophageal reflux disease without esophagitis    Persistent headaches    Moderate episode of recurrent major depressive disorder    Prediabetes    Stage 3a chronic kidney disease (CKD)    Lightheadedness    Shortness of breath    Aortic valve sclerosis     Advance Care Planning Advance Directive is not on file.  ACP discussion was held with the patient during this visit. Patient has an advance directive (not in EMR), copy requested.            Objective   Vitals:    08/12/24 1002   BP: 92/54   Pulse: 70   Resp: 16   Temp: 96.8 °F (36 °C)   Weight: 62 kg (136 lb 9.6 oz)   Height: 157.5 cm (62\")   PainSc: 10-Worst pain ever   PainLoc: Neck  Comment: Head and neck       Estimated body mass index is 24.98 kg/m² as calculated from the following:    Height as of this encounter: 157.5 cm (62\").    Weight as of this encounter: 62 kg (136 lb 9.6 oz).    BMI is within normal parameters. No other follow-up for BMI required.       Does the patient have evidence of cognitive impairment? Yes  Lab Results   Component Value Date    CHLPL 228 (H) 08/12/2024    TRIG 185 (H) 08/12/2024    HDL 90 08/12/2024     (H) 08/12/2024    VLDL 31 08/12/2024       ECG 12 Lead    Date/Time: 8/12/2024 10:58 AM  Performed by: Diandra Trent DO    Authorized by: Diandra Trent DO  Comparison: compared with previous ECG " from 2023  Similar to previous ECG  Rhythm: sinus rhythm  Rate: normal  BPM: 64  Conduction: conduction normal  QRS axis: normal  Other findings: T wave abnormality    Clinical impression: non-specific ECG                                                                                                Health  Risk Assessment    Smoking Status:  Social History     Tobacco Use   Smoking Status Never    Passive exposure: Never   Smokeless Tobacco Never     Alcohol Consumption:  Social History     Substance and Sexual Activity   Alcohol Use Never       Fall Risk Screen  STEADI Fall Risk Assessment was completed, and patient is at MODERATE risk for falls. Assessment completed on:2024    Depression Screenin/12/2024    10:05 AM   PHQ-2/PHQ-9 Depression Screening   Little Interest or Pleasure in Doing Things 1-->several days   Feeling Down, Depressed or Hopeless 1-->several days   Trouble Falling or Staying Asleep, or Sleeping Too Much 2-->more than half the days   Feeling Tired or Having Little Energy 1-->several days   Poor Appetite or Overeating 0-->not at all   Feeling Bad about Yourself - or that You are a Failure or Have Let Yourself or Your Family Down 0-->not at all   Trouble Concentrating on Things, Such as Reading the Newspaper or Watching Television 1-->several days   Moving or Speaking So Slowly that Other People Could Have Noticed? Or the Opposite - Being So Fidgety 0-->not at all   Thoughts that You Would be Better Off Dead or of Hurting Yourself in Some Way 0-->not at all   PHQ-9: Brief Depression Severity Measure Score 6   If You Checked Off Any Problems, How Difficult Have These Problems Made It For You to Do Your Work, Take Care of Things at Home, or Get Along with Other People? not difficult at all     Health Habits and Functional and Cognitive Screenin/12/2024    10:03 AM   Functional & Cognitive Status   Do you have difficulty preparing food and eating? Yes   Do you have  difficulty bathing yourself, getting dressed or grooming yourself? No   Do you have difficulty using the toilet? Yes   Do you have difficulty moving around from place to place? No   Do you have trouble with steps or getting out of a bed or a chair? No   Current Diet Well Balanced Diet   Dental Exam Not up to date   Eye Exam Not up to date   Exercise (times per week) 4 times per week   Current Exercises Include Gardening;Walking;House Cleaning   Do you need help using the phone?  No   Are you deaf or do you have serious difficulty hearing?  No   Do you need help to go to places out of walking distance? Yes   Do you need help shopping? Yes   Do you need help preparing meals?  No   Do you need help with housework?  Yes   Do you need help with laundry? No   Do you need help taking your medications? Yes   Do you need help managing money? Yes   Do you ever drive or ride in a car without wearing a seat belt? No   Have you felt unusual stress, anger or loneliness in the last month? Yes   Who do you live with? Spouse   If you need help, do you have trouble finding someone available to you? No   Have you been bothered in the last four weeks by sexual problems? No   Do you have difficulty concentrating, remembering or making decisions? Yes           Age-appropriate Screening Schedule:  Refer to the list below for future screening recommendations based on patient's age, sex and/or medical conditions. Orders for these recommended tests are listed in the plan section. The patient has been provided with a written plan.    Health Maintenance List  Health Maintenance   Topic Date Due    DXA SCAN  Never done    TDAP/TD VACCINES (1 - Tdap) Never done    ZOSTER VACCINE (1 of 2) Never done    RSV Vaccine - Adults (1 - 1-dose 60+ series) Never done    COVID-19 Vaccine (3 - 2023-24 season) 11/01/2024 (Originally 9/1/2023)    INFLUENZA VACCINE  03/31/2025 (Originally 8/1/2024)    Pneumococcal Vaccine 65+ (1 of 1 - PCV) 04/05/2025  "(Originally 2/5/2001)    ANNUAL WELLNESS VISIT  08/12/2025                                                                                                                                                CMS Preventative Services Quick Reference  Risk Factors Identified During Encounter  Fall Risk-High or Moderate: Discussed Fall Prevention in the home    The above risks/problems have been discussed with the patient.  Pertinent information has been shared with the patient in the After Visit Summary.  An After Visit Summary and PPPS were made available to the patient.    Follow Up:   Next Medicare Wellness visit to be scheduled in 1 year.         Additional E&M Note during same encounter follows:  Patient has additional, significant, and separately identifiable condition(s)/problem(s) that require work above and beyond the Medicare Wellness Visit     Chief Complaint  Establish Care (Off-boarding from [facility]) and Medicare Wellness-subsequent    Subjective   HPI  Irma is also being seen today for additional medical problem/s.  Poor historian due to dementia.   Her  has provided notes, her nephew is present with her today.   Chest pain is a concern. She describes the pain as being in epigastric region. Denies symptoms of heartburn.  She saw cardiology August 2023 for chest pain symptoms, labs and echocardiogram. She denies shortness of breath.     She has posterior headaches, left base of head and neck. She has completed MRI cervical spine  States that she has received 2 epidural injections without relief of neck pain. Her neurologist has started her on pregabalin.             Objective   Vital Signs:  BP 92/54   Pulse 70   Temp 96.8 °F (36 °C)   Resp 16   Ht 157.5 cm (62\")   Wt 62 kg (136 lb 9.6 oz)   BMI 24.98 kg/m²   Physical Exam  Vitals reviewed.   Cardiovascular:      Rate and Rhythm: Normal rate.      Heart sounds: Murmur heard.       with a grade of 2/6.   Pulmonary:      Effort: Pulmonary effort " is normal.      Breath sounds: Normal breath sounds.   Neurological:      Mental Status: She is alert.                 Assessment and Plan     Diagnoses and all orders for this visit:    1. Medicare annual wellness visit, subsequent (Primary)  -     CBC (No Diff)  -     Comprehensive Metabolic Panel  -     Lipid Panel With / Chol / HDL Ratio  -     Lipase    2. Moderate episode of recurrent major depressive disorder  Assessment & Plan:      Orders:  -     CBC (No Diff)  -     Comprehensive Metabolic Panel  -     Lipid Panel With / Chol / HDL Ratio  -     Lipase  -     sertraline (ZOLOFT) 100 MG tablet; Take 1.5 tablets by mouth Daily. TAKE 1 AND 1/2 TABLETS BY MOUTH  EVERY MORNING  Dispense: 135 tablet; Refill: 3    3. Epigastric pain  Comments:  Trial of PPI x 4-6 weeks to improve. Labs chekced today. Consider CT abdomen and pelvis if symptoms worsen.  Orders:  -     CBC (No Diff)  -     Comprehensive Metabolic Panel  -     Lipid Panel With / Chol / HDL Ratio  -     Lipase  -     omeprazole (priLOSEC) 20 MG capsule; Take 1 capsule by mouth Daily.  Dispense: 30 capsule; Refill: 1  -     ECG 12 Lead    4. Atypical chest pain  Comments:  Stable EKG compared to August 8, 2023  Orders:  -     CBC (No Diff)  -     Comprehensive Metabolic Panel  -     Lipid Panel With / Chol / HDL Ratio  -     Lipase  -     ECG 12 Lead               Medicare annual wellness visit, subsequent    Moderate episode of recurrent major depressive disorder    Epigastric pain    Atypical chest pain      Orders Placed This Encounter   Procedures    CBC (No Diff)     Order Specific Question:   Release to patient     Answer:   Routine Release [[id number]]     Order Specific Question:   [facility] Has the patient fasted?     Answer:   Yes    Comprehensive Metabolic Panel     Order Specific Question:   Release to patient     Answer:   Routine Release [[id number]]     Order Specific Question:   [facility] Has the patient fasted?     Answer:   Yes    Lipid  Panel With / Chol / HDL Ratio     Order Specific Question:   Release to patient     Answer:   Routine Release [[id number]]     Order Specific Question:   [facility] Has the patient fasted?     Answer:   Yes    Lipase     Order Specific Question:   Release to patient     Answer:   Routine Release [[id number]]     Order Specific Question:   [facility] Has the patient fasted?     Answer:   Yes    ECG 12 Lead     This order was created via procedure documentation     Order Specific Question:   Release to patient     Answer:   Routine Release [[id number]]     New Medications Ordered This Visit   Medications    sertraline (ZOLOFT) 100 MG tablet     Sig: Take 1.5 tablets by mouth Daily. TAKE 1 AND 1/2 TABLETS BY MOUTH  EVERY MORNING     Dispense:  135 tablet     Refill:  3     Requesting 1 year supply    omeprazole (priLOSEC) 20 MG capsule     Sig: Take 1 capsule by mouth Daily.     Dispense:  30 capsule     Refill:  1          Follow Up   Return in about 1 year (around 8/12/2025) for Medicare Wellness.  Patient was given instructions and counseling regarding her condition or for health maintenance advice. Please see specific information pulled into the AVS if appropriate.

## 2024-08-12 NOTE — PATIENT INSTRUCTIONS
Medicare Wellness  Personal Prevention Plan of Service     Date of Office Visit:    Encounter Provider:  Diandra Trent DO  Place of Service:  [facility]  Patient Name: Joanna Crockett  :  1935    As part of the Medicare Wellness portion of your visit today, we are providing you with this personalized preventive plan of services (PPPS). This plan is based upon recommendations of the [place] Preventive Services Task Force (USPSTF) and the Advisory Committee on Immunization Practices (ACIP).    This lists the preventive care services that should be considered, and provides dates of when you are due. Items listed as completed are up-to-date and do not require any further intervention.    Health Maintenance   Topic Date Due    DXA SCAN  Never done    TDAP/TD VACCINES (1 - Tdap) Never done    ZOSTER VACCINE (1 of 2) Never done    RSV Vaccine - Adults (1 - 1-dose 60+ series) Never done    COVID-19 Vaccine (3 - -24 season) 2024 (Originally 2023)    INFLUENZA VACCINE  2025 (Originally 2024)    Pneumococcal Vaccine 65+ (1 of 1 - PCV) 2025 (Originally 2001)    ANNUAL WELLNESS VISIT  2025       Orders Placed This Encounter   Procedures    CBC (No Diff)     Order Specific Question:   Release to patient     Answer:   Routine Release [[id number]]    Comprehensive Metabolic Panel     Order Specific Question:   Release to patient     Answer:   Routine Release [[id number]]    Lipid Panel With / Chol / HDL Ratio     Order Specific Question:   Release to patient     Answer:   Routine Release [[id number]]    Lipase     Order Specific Question:   Release to patient     Answer:   Routine Release [[id number]]       Return in about 1 year (around 2025) for Medicare Wellness.

## 2024-08-13 LAB
ALBUMIN SERPL-MCNC: 4.2 G/DL (ref 3.7–4.7)
ALP SERPL-CCNC: 74 IU/L (ref 44–121)
ALT SERPL-CCNC: 14 IU/L (ref 0–32)
AST SERPL-CCNC: 22 IU/L (ref 0–40)
BILIRUB SERPL-MCNC: 0.3 MG/DL (ref 0–1.2)
BUN SERPL-MCNC: 20 MG/DL (ref 8–27)
BUN/CREAT SERPL: 18 (ref 12–28)
CALCIUM SERPL-MCNC: 9.3 MG/DL (ref 8.7–10.3)
CHLORIDE SERPL-SCNC: 100 MMOL/L (ref 96–106)
CHOLEST SERPL-MCNC: 228 MG/DL (ref 100–199)
CHOLEST/HDLC SERPL: 2.5 RATIO (ref 0–4.4)
CO2 SERPL-SCNC: 27 MMOL/L (ref 20–29)
CREAT SERPL-MCNC: 1.1 MG/DL (ref 0.57–1)
EGFRCR SERPLBLD CKD-EPI 2021: 48 ML/MIN/1.73
ERYTHROCYTE [DISTWIDTH] IN BLOOD BY AUTOMATED COUNT: 12.2 % (ref 11.7–15.4)
GLOBULIN SER CALC-MCNC: 2.2 G/DL (ref 1.5–4.5)
GLUCOSE SERPL-MCNC: 92 MG/DL (ref 70–99)
HCT VFR BLD AUTO: 38.5 % (ref 34–46.6)
HDLC SERPL-MCNC: 90 MG/DL
HGB BLD-MCNC: 12.2 G/DL (ref 11.1–15.9)
LDLC SERPL CALC-MCNC: 107 MG/DL (ref 0–99)
LIPASE SERPL-CCNC: 59 U/L (ref 14–85)
MCH RBC QN AUTO: 29.5 PG (ref 26.6–33)
MCHC RBC AUTO-ENTMCNC: 31.7 G/DL (ref 31.5–35.7)
MCV RBC AUTO: 93 FL (ref 79–97)
PLATELET # BLD AUTO: 270 X10E3/UL (ref 150–450)
POTASSIUM SERPL-SCNC: 4.5 MMOL/L (ref 3.5–5.2)
PROT SERPL-MCNC: 6.4 G/DL (ref 6–8.5)
RBC # BLD AUTO: 4.13 X10E6/UL (ref 3.77–5.28)
SODIUM SERPL-SCNC: 140 MMOL/L (ref 134–144)
TRIGL SERPL-MCNC: 185 MG/DL (ref 0–149)
VLDLC SERPL CALC-MCNC: 31 MG/DL (ref 5–40)
WBC # BLD AUTO: 9.1 X10E3/UL (ref 3.4–10.8)

## 2024-08-14 ENCOUNTER — PRIOR AUTHORIZATION (OUTPATIENT)
Dept: NEUROLOGY | Facility: CLINIC | Age: 89
End: 2024-08-14
Payer: MEDICARE

## 2024-09-13 ENCOUNTER — TELEPHONE (OUTPATIENT)
Dept: FAMILY MEDICINE CLINIC | Facility: CLINIC | Age: 89
End: 2024-09-13

## 2024-09-13 NOTE — TELEPHONE ENCOUNTER
SPOKE WITH CHANEL NICOLE THEY ARE REQUESTING VERBAL ORDERS FOR HOME PHYSICAL THERAPY CALL BACK -[phone] CAN LEAVE VM

## 2024-09-16 ENCOUNTER — OFFICE VISIT (OUTPATIENT)
Dept: FAMILY MEDICINE CLINIC | Facility: CLINIC | Age: 89
End: 2024-09-16
Payer: MEDICARE

## 2024-09-16 VITALS
SYSTOLIC BLOOD PRESSURE: 108 MMHG | HEIGHT: 62 IN | HEART RATE: 76 BPM | DIASTOLIC BLOOD PRESSURE: 58 MMHG | OXYGEN SATURATION: 96 % | TEMPERATURE: 97.7 F | RESPIRATION RATE: 14 BRPM | WEIGHT: 136 LBS | BODY MASS INDEX: 25.03 KG/M2

## 2024-09-16 DIAGNOSIS — M25.552 PAIN IN JOINT OF LEFT HIP: ICD-10-CM

## 2024-09-16 DIAGNOSIS — Z96.642 STATUS POST LEFT HIP REPLACEMENT: Primary | ICD-10-CM

## 2024-09-16 RX ORDER — MELOXICAM 7.5 MG/1
7.5 TABLET ORAL DAILY
COMMUNITY

## 2024-09-16 RX ORDER — APIXABAN 2.5 MG/1
TABLET, FILM COATED ORAL
COMMUNITY
Start: 2024-09-11

## 2024-09-16 RX ORDER — METHOCARBAMOL 500 MG/1
TABLET, FILM COATED ORAL
COMMUNITY
Start: 2024-06-10 | End: 2024-09-16

## 2024-09-18 ENCOUNTER — OFFICE VISIT (OUTPATIENT)
Dept: NEUROLOGY | Facility: CLINIC | Age: 89
End: 2024-09-18
Payer: MEDICARE

## 2024-09-18 VITALS
HEIGHT: 62 IN | SYSTOLIC BLOOD PRESSURE: 102 MMHG | OXYGEN SATURATION: 94 % | BODY MASS INDEX: 25.03 KG/M2 | DIASTOLIC BLOOD PRESSURE: 58 MMHG | HEART RATE: 79 BPM | WEIGHT: 136 LBS

## 2024-09-18 DIAGNOSIS — G31.84 MILD COGNITIVE IMPAIRMENT WITH MEMORY LOSS: ICD-10-CM

## 2024-09-18 DIAGNOSIS — G44.86 CERVICOGENIC HEADACHE: Primary | ICD-10-CM

## 2024-09-18 PROBLEM — M97.8XXA PERIPROSTHETIC HIP FRACTURE: Status: ACTIVE | Noted: 2024-08-20

## 2024-09-18 PROBLEM — Z96.649 PERIPROSTHETIC HIP FRACTURE: Status: ACTIVE | Noted: 2024-08-20

## 2024-09-25 ENCOUNTER — CLINICAL SUPPORT (OUTPATIENT)
Dept: NEUROLOGY | Facility: CLINIC | Age: 89
End: 2024-09-25
Payer: MEDICARE

## 2024-09-25 DIAGNOSIS — G44.86 CERVICOGENIC HEADACHE: Primary | ICD-10-CM

## 2024-09-30 ENCOUNTER — READMISSION MANAGEMENT (OUTPATIENT)
Dept: CALL CENTER | Facility: HOSPITAL | Age: 89
End: 2024-09-30
Payer: MEDICARE

## 2024-09-30 NOTE — OUTREACH NOTE
Prep Survey      Flowsheet Row Responses   [facility] facility patient discharged from? Non-BH   Is LACE score < 7 ? Non-BH Discharge   Eligibility [facility]   Date of Admission 09/29/24   Date of Discharge 09/30/24   Discharge diagnosis Elevated troponin   Does the patient have one of the following disease processes/diagnoses(primary or secondary)? Other   Prep survey completed? Yes            Nelda STEWART - Registered Nurse

## 2024-10-01 ENCOUNTER — TRANSITIONAL CARE MANAGEMENT TELEPHONE ENCOUNTER (OUTPATIENT)
Dept: CALL CENTER | Facility: HOSPITAL | Age: 89
End: 2024-10-01
Payer: MEDICARE

## 2024-10-01 NOTE — OUTREACH NOTE
Call Center TCM Note      Flowsheet Row Responses   [facility] patient discharged from? Non-   Does the patient have one of the following disease processes/diagnoses(primary or secondary)? Other   TCM attempt successful? Yes   Call start time 1202   Call end time 1205   Discharge diagnosis Elevated troponin   Meds reviewed with patient/caregiver? Yes   Is the patient having any side effects they believe may be caused by any medication additions or changes? No   Does the patient have all medications ordered at discharge? Yes   Is the patient taking all medications as directed (includes completed medication regime)? Yes   Comments 10/4/2024 12:00 PM   Does the patient have an appointment with their PCP within 7-14 days of discharge? Yes   Has home health visited the patient within 72 hours of discharge? N/A   Psychosocial issues? No   Did the patient receive a copy of their discharge instructions? No   Nursing interventions Reviewed instructions with patient   What is the patient's perception of their health status since discharge? Improving  [Headache]   TCM call completed? Yes   Call end time 1205            Kariem Baron RN    10/1/2024, 12:06 EDT

## 2024-10-01 NOTE — OUTREACH NOTE
Call Center TCM Note      Flowsheet Row Responses   [facility] [place] [facility]? Non-[facility]   Does the patient have one of the following disease processes/diagnoses(primary or secondary)? Other   TCM attempt successful? No   Unsuccessful attempts Attempt 1            Karime Baron RN    10/1/2024, 09:28 EDT

## 2024-10-02 ENCOUNTER — TELEPHONE (OUTPATIENT)
Dept: FAMILY MEDICINE CLINIC | Facility: CLINIC | Age: 89
End: 2024-10-02

## 2024-10-02 NOTE — TELEPHONE ENCOUNTER
Provider: NICO SAHA    Caller: CARI    Relationship to Patient: [facility]    Phone Number: [phone]    Reason for Call: PATIENT HAD A FALL ON 10/01/24 [facility] REPORTED THAT NO INJURIES WERE SUSTAINED.

## 2024-10-03 ENCOUNTER — TELEPHONE (OUTPATIENT)
Dept: FAMILY MEDICINE CLINIC | Facility: CLINIC | Age: 89
End: 2024-10-03
Payer: MEDICARE

## 2024-10-04 ENCOUNTER — OFFICE VISIT (OUTPATIENT)
Dept: FAMILY MEDICINE CLINIC | Facility: CLINIC | Age: 89
End: 2024-10-04
Payer: MEDICARE

## 2024-10-04 VITALS
DIASTOLIC BLOOD PRESSURE: 62 MMHG | OXYGEN SATURATION: 94 % | WEIGHT: 139 LBS | HEIGHT: 62 IN | HEART RATE: 68 BPM | BODY MASS INDEX: 25.58 KG/M2 | RESPIRATION RATE: 14 BRPM | TEMPERATURE: 98.7 F | SYSTOLIC BLOOD PRESSURE: 100 MMHG

## 2024-10-04 DIAGNOSIS — D64.89 OTHER SPECIFIED ANEMIAS: ICD-10-CM

## 2024-10-04 DIAGNOSIS — Z09 HOSPITAL DISCHARGE FOLLOW-UP: ICD-10-CM

## 2024-10-04 DIAGNOSIS — F33.1 MODERATE EPISODE OF RECURRENT MAJOR DEPRESSIVE DISORDER: ICD-10-CM

## 2024-10-04 DIAGNOSIS — R19.7 DIARRHEA, UNSPECIFIED TYPE: ICD-10-CM

## 2024-10-04 DIAGNOSIS — R11.0 NAUSEA: ICD-10-CM

## 2024-10-04 DIAGNOSIS — R10.84 GENERALIZED ABDOMINAL PAIN: Primary | ICD-10-CM

## 2024-10-04 DIAGNOSIS — I21.4 NSTEMI (NON-ST ELEVATED MYOCARDIAL INFARCTION): ICD-10-CM

## 2024-10-04 PROCEDURE — 1125F AMNT PAIN NOTED PAIN PRSNT: CPT | Performed by: NURSE PRACTITIONER

## 2024-10-04 PROCEDURE — 1111F DSCHRG MED/CURRENT MED MERGE: CPT | Performed by: NURSE PRACTITIONER

## 2024-10-04 PROCEDURE — 99495 TRANSJ CARE MGMT MOD F2F 14D: CPT | Performed by: NURSE PRACTITIONER

## 2024-10-04 RX ORDER — SERTRALINE HYDROCHLORIDE 100 MG/1
200 TABLET, FILM COATED ORAL DAILY
Qty: 180 TABLET | Refills: 1 | Status: SHIPPED | OUTPATIENT
Start: 2024-10-04

## 2024-10-04 NOTE — PROGRESS NOTES
Transitional Care Follow Up Visit  Subjective     Joanna Crockett is a 89 y.o. female who presents for a transitional care management visit.    Within 48 business hours after discharge our office contacted her via telephone to coordinate her care and needs.      I reviewed and discussed the details of that call along with the discharge summary, hospital problems, inpatient lab results, inpatient diagnostic studies, and consultation reports with Joanna.     Current outpatient and discharge medications have been reconciled for the patient.  Reviewed by: ERNESTO Lazo          9/30/2024     6:06 PM   Date of TCM Phone Call   [facility]   Date of Admission 9/29/2024   Date of Discharge 9/30/2024     Risk for Readmission (LACE) No data recorded      Course During Hospital Stay:   Joanna Crockett is a 89 year-old female with past medical history of hypothyroidism, GERD, chronic kidney disease, stage IIIa, aortic valve sclerosis, baseline dementia, osteoarthritis, who presents to the emergency department for evaluation of chest pain that began this evening before dinner. Her chest pain is described as being constant in timing, midsternal in location, nonradiating, nonexertional, nonpleuritic, no exacerbating or relieving factors identified. Received ASA and nitroglycerin via EMS en route. She otherwise denies having any fevers, chills, shortness and breath, cough, hemoptysis, abdominal pain, vomiting, or any other symptoms at this time. She does not have any cardiac history. She did have a recent hip replacement surgery on 08/21/2024. Her daughter is present at bedside and helps contribute to the aforementioned history.  TTE obtained showed LVEF 50-55% with no regional wall motion abnormalities.   Troponin: 148 -> 264 -> 275   -Stop taking Eliquis  -Start taking Plavix 75mg daily  -Start taking Aspirin 81mg daily  -Start taking Crestor 20mg daily (for , A1C 6.1)   -Continue taking Protonix/PPI 40mg  "BID before meals  -Continue taking all other home medicines as prescribed     Still having abdominal pain. Nausea with occasional vomiting, headaches. Diarrhea always. Gallbladder has been removed. Daughter is concerned as she feels that symptoms are stemming from her abdominal pain. She is down and depressed as well since hip surgery and it has worsened since visit at  for MI. She is currently taking sertraline 150 mg once a day      The following portions of the patient's history were reviewed and updated as appropriate: allergies, current medications, past family history, past medical history, past social history, past surgical history, and problem list.    Review of Systems    Objective   /62   Pulse 68   Temp 98.7 °F (37.1 °C)   Resp 14   Ht 157.5 cm (62\")   Wt 63 kg (139 lb)   SpO2 94%   BMI 25.42 kg/m²   Physical Exam  Vitals and nursing note reviewed.   Constitutional:       Appearance: Normal appearance.   HENT:      Head: Normocephalic and atraumatic.   Cardiovascular:      Rate and Rhythm: Normal rate and regular rhythm.   Pulmonary:      Effort: Pulmonary effort is normal.      Breath sounds: Normal breath sounds.   Abdominal:      General: Bowel sounds are normal.      Tenderness: There is no abdominal tenderness.   Musculoskeletal:      Cervical back: Normal.      Thoracic back: Normal.      Lumbar back: No tenderness. Normal range of motion.   Skin:     General: Skin is warm.   Neurological:      General: No focal deficit present.      Mental Status: She is alert and oriented to person, place, and time.   Psychiatric:         Mood and Affect: Mood normal.         Assessment & Plan   Diagnoses and all orders for this visit:    1. Generalized abdominal pain (Primary)  Comments:  ordering stool samples. monitor for worsening symptoms. increase water intake. further POC to be determined after samples are resulted  Orders:  -     Gastrointestinal Panel, PCR - Stool, Per Rectum  -     " Pancreatic Elastase, Fecal - Stool, Per Rectum  -     Ova & Parasite Examination - Stool, Per Rectum    2. Diarrhea, unspecified type  -     Gastrointestinal Panel, PCR - Stool, Per Rectum  -     Pancreatic Elastase, Fecal - Stool, Per Rectum  -     Ova & Parasite Examination - Stool, Per Rectum    3. Nausea  -     Gastrointestinal Panel, PCR - Stool, Per Rectum  -     Pancreatic Elastase, Fecal - Stool, Per Rectum  -     Ova & Parasite Examination - Stool, Per Rectum    4. Other specified anemias  -     Gastrointestinal Panel, PCR - Stool, Per Rectum    5. Moderate episode of recurrent major depressive disorder  Assessment & Plan:  Patient's depression is a single episode that is mild without psychosis. Depression is active and stable.    Plan:   Continue current medication therapy     Followup in 3 months.     Orders:  -     sertraline (ZOLOFT) 100 MG tablet; Take 2 tablets by mouth Daily.  Dispense: 180 tablet; Refill: 1    6. Hospital discharge follow-up    7. NSTEMI (non-ST elevated myocardial infarction)  Comments:  keep appts with cardiology, discussion about medication management and continue medications as prescribed in MAR. go to the ER with s/s of SOB, chest pains.        ERNESTO Overton  12:47 EDT  10/04/2024

## 2024-10-04 NOTE — ASSESSMENT & PLAN NOTE
Patient's depression is a single episode that is mild without psychosis. Depression is active and stable.    Plan:   Continue current medication therapy     Followup in 3 months.

## 2024-10-07 ENCOUNTER — TELEPHONE (OUTPATIENT)
Dept: FAMILY MEDICINE CLINIC | Facility: CLINIC | Age: 89
End: 2024-10-07

## 2024-10-07 ENCOUNTER — LAB (OUTPATIENT)
Dept: FAMILY MEDICINE CLINIC | Facility: CLINIC | Age: 89
End: 2024-10-07
Payer: MEDICARE

## 2024-10-07 NOTE — TELEPHONE ENCOUNTER
Caller: LIUDMILA MCLAUGHLIN    Relationship: Home Health    Best call back number: [phone]     What orders are you requesting (i.e. lab or imaging): NURSE TYSON WANTED TO LOOK AT SURGICAL WOUND THEY THINK IS INFECTED AND HAS PUSS COMING OUT.     In what timeframe would the patient need to come in: ASAP    Where will you receive your lab/imaging services: IN HOME     Additional notes: NURSE TYSON NEEDED TO LOOK AT PATIENT SURGICAL WOUND THAT COULD BE INFECTED.

## 2024-10-08 ENCOUNTER — TELEPHONE (OUTPATIENT)
Dept: FAMILY MEDICINE CLINIC | Facility: CLINIC | Age: 89
End: 2024-10-08
Payer: MEDICARE

## 2024-10-08 NOTE — TELEPHONE ENCOUNTER
PT NURSE WENT IN FOR EVALUATION TO START CARE, SHE NOTICED THAT PATIENT WOUND SINCE POST OP IS DRAINING QUITE SIGNIFICANTLY AND APPEARS TO BE AGGRAVATED. BELIEVED PATIENT WILL NEED WOUND CARE, SHE ASKING  THAT THE OFFICE SCHEDULE TO SEE HER TO ASSESS THE CORRECT TYPE OF WOUND CARE AND ALSO BELIEVED PATIENT WOULD BENEFIT WITH OCCUPATIONAL THERAPY AS WELL.      PLEASE ADVISE

## 2024-10-10 ENCOUNTER — OFFICE VISIT (OUTPATIENT)
Dept: FAMILY MEDICINE CLINIC | Facility: CLINIC | Age: 89
End: 2024-10-10
Payer: MEDICARE

## 2024-10-10 VITALS
RESPIRATION RATE: 16 BRPM | OXYGEN SATURATION: 96 % | HEIGHT: 62 IN | HEART RATE: 66 BPM | SYSTOLIC BLOOD PRESSURE: 92 MMHG | TEMPERATURE: 96.8 F | DIASTOLIC BLOOD PRESSURE: 52 MMHG | BODY MASS INDEX: 25.03 KG/M2 | WEIGHT: 136 LBS

## 2024-10-10 DIAGNOSIS — T81.49XA SURGICAL WOUND INFECTION: ICD-10-CM

## 2024-10-10 DIAGNOSIS — Z96.642 STATUS POST LEFT HIP REPLACEMENT: Primary | ICD-10-CM

## 2024-10-10 PROCEDURE — 99213 OFFICE O/P EST LOW 20 MIN: CPT | Performed by: FAMILY MEDICINE

## 2024-10-10 PROCEDURE — 1125F AMNT PAIN NOTED PAIN PRSNT: CPT | Performed by: FAMILY MEDICINE

## 2024-10-10 RX ORDER — CEPHALEXIN 500 MG/1
500 CAPSULE ORAL
COMMUNITY
Start: 2024-10-07

## 2024-10-10 NOTE — PROGRESS NOTES
Chief Complaint  Wound Check (Left hip post surgery, Aug 21st was surgery, reopened then became infected. )    Subjective          Joanna Crockett presents to [facility]  Wound Check    She had left hip replacement in August 2024. Her wound healed nicely. However, since then, she has reopened the wound while getting up and scraped her left leg against the side of chair.   She contacted her surgeon and they placed her on cephalexin, today is day 3 of treatment. Thinks that wound is looking better, but requesting wound care via home health.    She denies increased pain. She is bearing weight.     The following portions of the patient's history were reviewed and updated as appropriate: allergies, current medications, past family history, past medical history, past social history, past surgical history, and problem list.    Objective      Physical Exam  Vitals reviewed.   Pulmonary:      Effort: Pulmonary effort is normal. No respiratory distress.   Musculoskeletal:      Comments: Ambulating with walker   Skin:     Comments: Open wound with purulent drainage left hip surgical site, see picture   Neurological:      Mental Status: She is alert.        Result Review :                Assessment and Plan    Diagnoses and all orders for this visit:    1. Status post left hip replacement (Primary)  -     Ambulatory Referral to Home Health    2. Surgical wound infection  -     Ambulatory Referral to Home Health    Continue cephalexin. Wound care orders added to home health.   Advised her to seek care with her surgeon, too.  Discussed possibility of hip joint becoming infected.  Follow up if no improvement.       Follow Up   No follow-ups on file.  Patient was given instructions and counseling regarding her condition or for health maintenance advice. Please see specific information pulled into the AVS if appropriate.   Answers submitted by the patient for this visit:  Other (Submitted on  10/10/2024)  Please describe your symptoms.: Infected wound  Have you had these symptoms before?: No  How long have you been having these symptoms?: 1-2 weeks  Please list any medications you are currently taking for this condition.: Keflex  Please describe any probable cause for these symptoms. : Had surgery  Primary Reason for Visit (Submitted on 10/10/2024)  What is the primary reason for your visit?: Problem Not Listed

## 2024-10-11 ENCOUNTER — TELEPHONE (OUTPATIENT)
Dept: FAMILY MEDICINE CLINIC | Facility: CLINIC | Age: 89
End: 2024-10-11
Payer: MEDICARE

## 2024-10-11 NOTE — TELEPHONE ENCOUNTER
Caller: [facility]    Relationship: Home Health    Best call back number: [phone]    What orders are you requesting (i.e. lab or imaging): ORDERS FOR WOUND CARE     Additional notes: THE CALLER ALSO NEEDS THE OFFICE NOTE FROM YESTERDAYS APPOINTMENT

## 2024-10-15 LAB
ADV 40+41 DNA STL QL NAA+NON-PROBE: NOT DETECTED
ASTRO TYP 1-8 RNA STL QL NAA+NON-PROBE: NOT DETECTED
C CAYETANENSIS DNA STL QL NAA+NON-PROBE: NOT DETECTED
C COLI+JEJ+UPSA DNA STL QL NAA+NON-PROBE: NOT DETECTED
C DIF TOX TCDA+TCDB STL QL NAA+NON-PROBE: NOT DETECTED
CRYPTOSP DNA STL QL NAA+NON-PROBE: NOT DETECTED
E COLI O157 DNA STL QL NAA+NON-PROBE: ABNORMAL
E HISTOLYT DNA STL QL NAA+NON-PROBE: NOT DETECTED
EAEC PAA PLAS AGGR+AATA ST NAA+NON-PRB: NOT DETECTED
EC STX1+STX2 GENES STL QL NAA+NON-PROBE: NOT DETECTED
ELASTASE PANC STL-MCNT: >800 UG ELAST./G
EPEC EAE GENE STL QL NAA+NON-PROBE: DETECTED
ETEC LTA+ST1A+ST1B TOX ST NAA+NON-PROBE: NOT DETECTED
G LAMBLIA DNA STL QL NAA+NON-PROBE: NOT DETECTED
NOROVIRUS GI+II RNA STL QL NAA+NON-PROBE: NOT DETECTED
O+P SPEC MICRO: NORMAL
O+P STL CONC: NORMAL
P SHIGELLOIDES DNA STL QL NAA+NON-PROBE: NOT DETECTED
RVA RNA STL QL NAA+NON-PROBE: NOT DETECTED
S ENT+BONG DNA STL QL NAA+NON-PROBE: NOT DETECTED
SAPO I+II+IV+V RNA STL QL NAA+NON-PROBE: NOT DETECTED
SHIGELLA SP+EIEC IPAH ST NAA+NON-PROBE: NOT DETECTED
V CHOL+PARA+VUL DNA STL QL NAA+NON-PROBE: NOT DETECTED
V CHOLERAE DNA STL QL NAA+NON-PROBE: NOT DETECTED
Y ENTEROCOL DNA STL QL NAA+NON-PROBE: NOT DETECTED

## 2024-10-18 ENCOUNTER — TELEPHONE (OUTPATIENT)
Dept: FAMILY MEDICINE CLINIC | Facility: CLINIC | Age: 89
End: 2024-10-18
Payer: MEDICARE

## 2024-10-23 ENCOUNTER — TELEPHONE (OUTPATIENT)
Dept: FAMILY MEDICINE CLINIC | Facility: CLINIC | Age: 89
End: 2024-10-23

## 2024-10-23 NOTE — TELEPHONE ENCOUNTER
Caller: [facility]    Relationship: Home Health    Best call back number: [phone]      Additional notes: [facility] WOULD LIKE TO REPORT THE PATIENT WAS WALKING WITH HER WALKER YESTERDAY  BUT NOT HER LEG BRACE. THE PATIENT FELL INTO THE COUCH BUT NO INJURIES WERE REPORTED.

## 2024-10-25 ENCOUNTER — TELEPHONE (OUTPATIENT)
Dept: FAMILY MEDICINE CLINIC | Facility: CLINIC | Age: 89
End: 2024-10-25

## 2024-10-25 DIAGNOSIS — H69.93 EUSTACHIAN TUBE DYSFUNCTION, BILATERAL: Primary | ICD-10-CM

## 2024-10-25 RX ORDER — FLUTICASONE PROPIONATE 50 UG/1
2 SPRAY, METERED NASAL DAILY
Qty: 9.9 ML | Refills: 2 | Status: SHIPPED | OUTPATIENT
Start: 2024-10-25

## 2024-10-25 NOTE — TELEPHONE ENCOUNTER
STEPH FROM [facility] IS WANTING TO REQUEST AN ORDER FOR A SMALLER ROLATOR AND HAVE IT SEND TO [facility]..           PLEASE ADVISE

## 2024-10-28 ENCOUNTER — TELEPHONE (OUTPATIENT)
Dept: FAMILY MEDICINE CLINIC | Facility: CLINIC | Age: 89
End: 2024-10-28

## 2024-10-28 NOTE — TELEPHONE ENCOUNTER
Caller: [facility]    Relationship:     Best call back number: [phone]    What orders are you requesting (i.e. lab or imaging): UA    In what timeframe would the patient need to come in:     Where will you receive your lab/imaging services: HOME    Additional notes: PT IS HAVING BURNING, AND PRESSURE AND URINE IS CLOUDY. WANTS TO HAVE UA ORDERED AND SOMEONE CALL IN VERBAL ORDER

## 2024-10-31 ENCOUNTER — TELEPHONE (OUTPATIENT)
Dept: FAMILY MEDICINE CLINIC | Facility: CLINIC | Age: 89
End: 2024-10-31
Payer: MEDICARE

## 2024-10-31 NOTE — TELEPHONE ENCOUNTER
Caller: [facility]    Relationship: Other    Best call back number: [phone]    What orders are you requesting (i.e. lab or imaging): WOUND CARE ORDERS       Additional notes: PLEASE CALL [facility] TO LET HER KNOW IF SHE CAN GET VERBAL ORDERS FOR WOUND CARE

## 2024-11-07 ENCOUNTER — TELEPHONE (OUTPATIENT)
Dept: FAMILY MEDICINE CLINIC | Facility: CLINIC | Age: 89
End: 2024-11-07
Payer: MEDICARE

## 2024-11-20 ENCOUNTER — CLINICAL SUPPORT (OUTPATIENT)
Dept: NEUROLOGY | Facility: CLINIC | Age: 89
End: 2024-11-20
Payer: MEDICARE

## 2024-11-20 DIAGNOSIS — G44.86 CERVICOGENIC HEADACHE: Primary | ICD-10-CM

## 2024-11-20 RX ORDER — ROSUVASTATIN CALCIUM 20 MG/1
20 TABLET, COATED ORAL DAILY
COMMUNITY

## 2024-11-20 NOTE — PROGRESS NOTES
CC: Cervicogenic headaches.    Procedure Note:  1.         Patient was positioned comfortably  2.         Before injections are started, 10 Trigger Points sites are identified throughout the bilateral upper trapezius muscle, levator scapulae, and erector spinae muscles.    3.         Overlying skin area was cleaned with Alcohol swab                                                                                                              4.         Before injection, trigger points sites were palpated for local twitch and referred pain to confirms placement                         5.         Local anesthetic was mixed with Depo-Medrol (5 cc of Marcaine 0.5% mixed with 5 cc of Depo-Medrol at 40 mg/ml - for a total of 10 cc)  6.         30 gauge ½ inch needle was utilized to ensure patient comfort          7.         I started with the most tender spot in above mentioned Trigger Points   1.   Localize most tender spot within taut muscle-fibers  2.   Fix tender spot between fingers (1-2 cm in size)   1.   Prevents from rolling away from needle  2.   Controls subcutaneous bleeding  3.   Before injection, patient was instructed of possible pain on injection  4.   Direct needle at 30 degree angle off skin   8.         Insert needle into skin 1-2 cm from Trigger Point   9.         Advance needle into Trigger Point   10.       Use 1cc anesthetic at each Trigger Points identified in step #2  11.       Redirect needle and reinject                                                                                              1.   Withdraw needle to subcutaneous tissue  2.   Redirect needle into adjacent tender areas  3.   Repeat until local twitch or tautness resolves  12.   After each of the 10 injections I held direct pressure at injection site for 2 minutes to prevents hematoma formation  13.   The same procedure was repeated for other Tender Points located in the upper trapezius muscle, levator scapulae and erector spinae  bilaterally  14.   Patient was instructed to gently stretch injected areas to full active range of motion in all directions and was instructed to repeat range of motion three times after injection  15.   Post-Procedure patient was instructed to avoid over-using injected area for 3-4 days   1.   Maintain active range of motion of injected muscle  2.   Patient applies ice to injected areas for a few hours  3.   Anticipate post-injection soreness for 3-4 days  There was no evidence of complications from injection was noted such as local Skin Infection  at injection site or local hematoma at injection site    Note: I used even smaller needle on today's visit but still she vomited painful.  After first injection, she reports that it helped with headaches only for 2 to 3 weeks and then the headaches became more worse.  I am hoping that with second injection, headache frequency and intensity will improve.  If not then I will consider an alternative treatment for her headaches.  Otherwise, I will see her back in clinic in 6 to 8 weeks for next injection.